# Patient Record
Sex: FEMALE | Race: WHITE | NOT HISPANIC OR LATINO | Employment: PART TIME | URBAN - NONMETROPOLITAN AREA
[De-identification: names, ages, dates, MRNs, and addresses within clinical notes are randomized per-mention and may not be internally consistent; named-entity substitution may affect disease eponyms.]

---

## 2017-12-05 ENCOUNTER — TRANSCRIBE ORDERS (OUTPATIENT)
Dept: ADMINISTRATIVE | Facility: HOSPITAL | Age: 32
End: 2017-12-05

## 2017-12-05 ENCOUNTER — HOSPITAL ENCOUNTER (OUTPATIENT)
Dept: GENERAL RADIOLOGY | Facility: HOSPITAL | Age: 32
Discharge: HOME | End: 2017-12-05
Admitting: NURSE PRACTITIONER

## 2017-12-05 DIAGNOSIS — M54.2 CERVICAL PAIN: Primary | ICD-10-CM

## 2017-12-05 DIAGNOSIS — M54.2 CERVICAL PAIN: ICD-10-CM

## 2017-12-05 PROCEDURE — 72050 X-RAY EXAM NECK SPINE 4/5VWS: CPT

## 2017-12-05 PROCEDURE — 72050 X-RAY EXAM NECK SPINE 4/5VWS: CPT | Performed by: RADIOLOGY

## 2019-02-01 ENCOUNTER — HOSPITAL ENCOUNTER (EMERGENCY)
Facility: HOSPITAL | Age: 34
Discharge: HOME | End: 2019-02-01
Admitting: EMERGENCY MEDICINE

## 2019-02-01 ENCOUNTER — APPOINTMENT (OUTPATIENT)
Dept: GENERAL RADIOLOGY | Facility: HOSPITAL | Age: 34
End: 2019-02-01

## 2019-02-01 VITALS
HEART RATE: 81 BPM | TEMPERATURE: 98.3 F | DIASTOLIC BLOOD PRESSURE: 72 MMHG | HEIGHT: 65 IN | SYSTOLIC BLOOD PRESSURE: 125 MMHG | BODY MASS INDEX: 24.16 KG/M2 | OXYGEN SATURATION: 99 % | WEIGHT: 145 LBS | RESPIRATION RATE: 18 BRPM

## 2019-02-01 DIAGNOSIS — R93.7 ABNORMAL X-RAY OF THORACIC SPINE: ICD-10-CM

## 2019-02-01 DIAGNOSIS — M54.6 ACUTE RIGHT-SIDED THORACIC BACK PAIN: Primary | ICD-10-CM

## 2019-02-01 LAB
B-HCG UR QL: NEGATIVE
BACTERIA UR QL AUTO: ABNORMAL /HPF
BILIRUB UR QL STRIP: NEGATIVE
CLARITY UR: ABNORMAL
COLOR UR: ABNORMAL
GLUCOSE UR STRIP-MCNC: NEGATIVE MG/DL
HGB UR QL STRIP.AUTO: ABNORMAL
HYALINE CASTS UR QL AUTO: ABNORMAL /LPF
KETONES UR QL STRIP: ABNORMAL
LEUKOCYTE ESTERASE UR QL STRIP.AUTO: ABNORMAL
NITRITE UR QL STRIP: NEGATIVE
PH UR STRIP.AUTO: 7 [PH] (ref 5–8)
PROT UR QL STRIP: ABNORMAL
RBC # UR: ABNORMAL /HPF
REF LAB TEST METHOD: ABNORMAL
SP GR UR STRIP: 1.03 (ref 1–1.03)
SQUAMOUS #/AREA URNS HPF: ABNORMAL /HPF
UROBILINOGEN UR QL STRIP: ABNORMAL
WBC UR QL AUTO: ABNORMAL /HPF

## 2019-02-01 PROCEDURE — 81001 URINALYSIS AUTO W/SCOPE: CPT | Performed by: NURSE PRACTITIONER

## 2019-02-01 PROCEDURE — 72072 X-RAY EXAM THORAC SPINE 3VWS: CPT | Performed by: RADIOLOGY

## 2019-02-01 PROCEDURE — 81025 URINE PREGNANCY TEST: CPT | Performed by: NURSE PRACTITIONER

## 2019-02-01 PROCEDURE — 96372 THER/PROPH/DIAG INJ SC/IM: CPT

## 2019-02-01 PROCEDURE — 71101 X-RAY EXAM UNILAT RIBS/CHEST: CPT | Performed by: RADIOLOGY

## 2019-02-01 PROCEDURE — 25010000002 ORPHENADRINE CITRATE PER 60 MG: Performed by: NURSE PRACTITIONER

## 2019-02-01 PROCEDURE — 71101 X-RAY EXAM UNILAT RIBS/CHEST: CPT

## 2019-02-01 PROCEDURE — 25010000002 KETOROLAC TROMETHAMINE PER 15 MG: Performed by: NURSE PRACTITIONER

## 2019-02-01 PROCEDURE — 72072 X-RAY EXAM THORAC SPINE 3VWS: CPT

## 2019-02-01 PROCEDURE — 99283 EMERGENCY DEPT VISIT LOW MDM: CPT

## 2019-02-01 RX ORDER — ORPHENADRINE CITRATE 30 MG/ML
60 INJECTION INTRAMUSCULAR; INTRAVENOUS ONCE
Status: COMPLETED | OUTPATIENT
Start: 2019-02-01 | End: 2019-02-01

## 2019-02-01 RX ORDER — HYDROCODONE BITARTRATE AND ACETAMINOPHEN 5; 325 MG/1; MG/1
1 TABLET ORAL EVERY 6 HOURS PRN
Qty: 12 TABLET | Refills: 0 | Status: SHIPPED | OUTPATIENT
Start: 2019-02-01 | End: 2022-08-12

## 2019-02-01 RX ORDER — CYCLOBENZAPRINE HCL 10 MG
10 TABLET ORAL 3 TIMES DAILY PRN
Qty: 21 TABLET | Refills: 0 | Status: SHIPPED | OUTPATIENT
Start: 2019-02-01 | End: 2022-08-12

## 2019-02-01 RX ORDER — KETOROLAC TROMETHAMINE 30 MG/ML
60 INJECTION, SOLUTION INTRAMUSCULAR; INTRAVENOUS ONCE
Status: COMPLETED | OUTPATIENT
Start: 2019-02-01 | End: 2019-02-01

## 2019-02-01 RX ADMIN — KETOROLAC TROMETHAMINE 60 MG: 60 INJECTION, SOLUTION INTRAMUSCULAR at 15:09

## 2019-02-01 RX ADMIN — ORPHENADRINE CITRATE 60 MG: 30 INJECTION INTRAMUSCULAR; INTRAVENOUS at 15:09

## 2019-02-01 NOTE — DISCHARGE INSTRUCTIONS
Follow up with your primary care provider to discuss further imaging and treatment.    Return to the emergency room for worsening symptoms.

## 2019-02-01 NOTE — ED PROVIDER NOTES
Subjective     History provided by:  Patient   used: No    Back Pain   Location:  Thoracic spine  Quality:  Aching and shooting  Radiates to:  Does not radiate  Pain severity:  Moderate  Pain is:  Same all the time  Onset quality:  Gradual  Duration:  2 days  Timing:  Constant  Progression:  Waxing and waning  Chronicity:  Recurrent  Context: lifting heavy objects and twisting    Context: not emotional stress, not falling, not jumping from heights, not MVA and not pedestrian accident    Relieved by:  None tried  Worsened by:  Twisting, standing and bending  Ineffective treatments:  Ibuprofen, lying down and NSAIDs  Associated symptoms: no abdominal swelling, no fever, no headaches, no leg pain, no numbness, no paresthesias, no pelvic pain, no perianal numbness and no weakness    Risk factors: no hx of cancer, no hx of osteoporosis, not obese, no recent surgery and no steroid use        Review of Systems   Constitutional: Negative.  Negative for fever.   HENT: Negative.    Eyes: Negative.    Respiratory: Negative.    Cardiovascular: Negative.    Gastrointestinal: Negative.    Endocrine: Negative.    Genitourinary: Negative.  Negative for pelvic pain.   Musculoskeletal: Positive for back pain.   Skin: Negative.    Allergic/Immunologic: Negative.    Neurological: Negative.  Negative for weakness, numbness, headaches and paresthesias.   Hematological: Negative.    Psychiatric/Behavioral: Negative.        Past Medical History:   Diagnosis Date   • Anxiety        No Known Allergies    Past Surgical History:   Procedure Laterality Date   • ADENOIDECTOMY     • TONSILLECTOMY         History reviewed. No pertinent family history.    Social History     Socioeconomic History   • Marital status:      Spouse name: Not on file   • Number of children: Not on file   • Years of education: Not on file   • Highest education level: Not on file   Tobacco Use   • Smoking status: Never Smoker   Substance and  Sexual Activity   • Alcohol use: No     Frequency: Never   • Drug use: No           Objective   Physical Exam   Constitutional: She is oriented to person, place, and time. She appears well-developed and well-nourished.   HENT:   Head: Normocephalic.   Right Ear: External ear normal.   Left Ear: External ear normal.   Mouth/Throat: Oropharynx is clear and moist.   Eyes: Conjunctivae and EOM are normal. Pupils are equal, round, and reactive to light.   Neck: Normal range of motion. Neck supple.   Cardiovascular: Normal rate, regular rhythm, normal heart sounds and intact distal pulses.   Pulmonary/Chest: Effort normal and breath sounds normal.   Abdominal: Soft. Bowel sounds are normal.   Musculoskeletal:        Thoracic back: She exhibits decreased range of motion, tenderness and pain.   Neurological: She is alert and oriented to person, place, and time.   Skin: Skin is warm and dry. Capillary refill takes less than 2 seconds.   Psychiatric: She has a normal mood and affect. Her behavior is normal. Thought content normal.   Nursing note and vitals reviewed.      Procedures           ED Course  ED Course as of Feb 10 1626   Fri Feb 01, 2019   1631 Discussed x-ray findings, including abnormal T8 vertebrae. Explained patient should follow up with PCP for further imaging and should return to the emergency room for worsening symptoms.  [MARILEE]      ED Course User Index  [MARILEE] Maxwell Chamapgne APRN                  [facility]      Final diagnoses:   Acute right-sided thoracic back pain   Abnormal x-ray of thoracic spine            Maxwell Champagne APRN  02/10/19 1626

## 2019-12-19 ENCOUNTER — OFFICE VISIT (OUTPATIENT)
Dept: RETAIL CLINIC | Facility: CLINIC | Age: 34
End: 2019-12-19

## 2019-12-19 VITALS
TEMPERATURE: 99.1 F | HEART RATE: 98 BPM | OXYGEN SATURATION: 98 % | SYSTOLIC BLOOD PRESSURE: 104 MMHG | DIASTOLIC BLOOD PRESSURE: 70 MMHG | BODY MASS INDEX: 23.16 KG/M2 | WEIGHT: 139.2 LBS | RESPIRATION RATE: 18 BRPM

## 2019-12-19 DIAGNOSIS — J10.1 INFLUENZA A: Primary | ICD-10-CM

## 2019-12-19 LAB
EXPIRATION DATE: ABNORMAL
EXPIRATION DATE: NORMAL
FLUAV AG NPH QL: POSITIVE
FLUBV AG NPH QL: NEGATIVE
INTERNAL CONTROL: ABNORMAL
INTERNAL CONTROL: NORMAL
Lab: ABNORMAL
Lab: NORMAL
S PYO AG THROAT QL: NEGATIVE

## 2019-12-19 PROCEDURE — 87880 STREP A ASSAY W/OPTIC: CPT | Performed by: NURSE PRACTITIONER

## 2019-12-19 PROCEDURE — 87804 INFLUENZA ASSAY W/OPTIC: CPT | Performed by: NURSE PRACTITIONER

## 2019-12-19 PROCEDURE — 99203 OFFICE O/P NEW LOW 30 MIN: CPT | Performed by: NURSE PRACTITIONER

## 2019-12-19 RX ORDER — IBUPROFEN 600 MG/1
600 TABLET ORAL EVERY 6 HOURS PRN
Qty: 15 TABLET | Refills: 0 | Status: SHIPPED | OUTPATIENT
Start: 2019-12-19 | End: 2019-12-22

## 2019-12-19 RX ORDER — OSELTAMIVIR PHOSPHATE 75 MG/1
75 CAPSULE ORAL 2 TIMES DAILY
Qty: 10 CAPSULE | Refills: 0 | Status: SHIPPED | OUTPATIENT
Start: 2019-12-19 | End: 2019-12-24

## 2019-12-19 NOTE — PATIENT INSTRUCTIONS
"Influenza, Adult  Influenza, more commonly known as \"the flu,\" is a viral infection that mainly affects the respiratory tract. The respiratory tract includes organs that help you breathe, such as the lungs, nose, and throat. The flu causes many symptoms similar to the common cold along with high fever and body aches.  The flu spreads easily from person to person (is contagious). Getting a flu shot (influenza vaccination) every year is the best way to prevent the flu.  What are the causes?  This condition is caused by the influenza virus. You can get the virus by:  · Breathing in droplets that are in the air from an infected person's cough or sneeze.  · Touching something that has been exposed to the virus (has been contaminated) and then touching your mouth, nose, or eyes.  What increases the risk?  The following factors may make you more likely to get the flu:  · Not washing or sanitizing your hands often.  · Having close contact with many people during cold and flu season.  · Touching your mouth, eyes, or nose without first washing or sanitizing your hands.  · Not getting a yearly (annual) flu shot.  You may have a higher risk for the flu, including serious problems such as a lung infection (pneumonia), if you:  · Are older than 65.  · Are pregnant.  · Have a weakened disease-fighting system (immune system). You may have a weakened immune system if you:  ? Have HIV or AIDS.  ? Are undergoing chemotherapy.  ? Are taking medicines that reduce (suppress) the activity of your immune system.  · Have a long-term (chronic) illness, such as heart disease, kidney disease, diabetes, or lung disease.  · Have a liver disorder.  · Are severely overweight (morbidly obese).  · Have anemia. This is a condition that affects your red blood cells.  · Have asthma.  What are the signs or symptoms?  Symptoms of this condition usually begin suddenly and last 4-14 days. They may include:  · Fever and chills.  · Headaches, body aches, or " muscle aches.  · Sore throat.  · Cough.  · Runny or stuffy (congested) nose.  · Chest discomfort.  · Poor appetite.  · Weakness or fatigue.  · Dizziness.  · Nausea or vomiting.  How is this diagnosed?  This condition may be diagnosed based on:  · Your symptoms and medical history.  · A physical exam.  · Swabbing your nose or throat and testing the fluid for the influenza virus.  How is this treated?  If the flu is diagnosed early, you can be treated with medicine that can help reduce how severe the illness is and how long it lasts (antiviral medicine). This may be given by mouth (orally) or through an IV.  Taking care of yourself at home can help relieve symptoms. Your health care provider may recommend:  · Taking over-the-counter medicines.  · Drinking plenty of fluids.  In many cases, the flu goes away on its own. If you have severe symptoms or complications, you may be treated in a hospital.  Follow these instructions at home:  Activity  · Rest as needed and get plenty of sleep.  · Stay home from work or school as told by your health care provider. Unless you are visiting your health care provider, avoid leaving home until your fever has been gone for 24 hours without taking medicine.  Eating and drinking  · Take an oral rehydration solution (ORS). This is a drink that is sold at pharmacies and retail stores.  · Drink enough fluid to keep your urine pale yellow.  · Drink clear fluids in small amounts as you are able. Clear fluids include water, ice chips, diluted fruit juice, and low-calorie sports drinks.  · Eat bland, easy-to-digest foods in small amounts as you are able. These foods include bananas, applesauce, rice, lean meats, toast, and crackers.  · Avoid drinking fluids that contain a lot of sugar or caffeine, such as energy drinks, regular sports drinks, and soda.  · Avoid alcohol.  · Avoid spicy or fatty foods.  General instructions         · Take over-the-counter and prescription medicines only as told  "by your health care provider.  · Use a cool mist humidifier to add humidity to the air in your home. This can make it easier to breathe.  · Cover your mouth and nose when you cough or sneeze.  · Wash your hands with soap and water often, especially after you cough or sneeze. If soap and water are not available, use alcohol-based hand .  · Keep all follow-up visits as told by your health care provider. This is important.  How is this prevented?    · Get an annual flu shot. You may get the flu shot in late summer, fall, or winter. Ask your health care provider when you should get your flu shot.  · Avoid contact with people who are sick during cold and flu season. This is generally fall and winter.  Contact a health care provider if:  · You develop new symptoms.  · You have:  ? Chest pain.  ? Diarrhea.  ? A fever.  · Your cough gets worse.  · You produce more mucus.  · You feel nauseous or you vomit.  Get help right away if:  · You develop shortness of breath or difficulty breathing.  · Your skin or nails turn a bluish color.  · You have severe pain or stiffness in your neck.  · You develop a sudden headache or sudden pain in your face or ear.  · You cannot eat or drink without vomiting.  Summary  · Influenza, more commonly known as \"the flu,\" is a viral infection that primarily affects your respiratory tract.  · Symptoms of the flu usually begin suddenly and last 4-14 days.  · Getting an annual flu shot is the best way to prevent getting the flu.  · Stay home from work or school as told by your health care provider. Unless you are visiting your health care provider, avoid leaving home until your fever has been gone for 24 hours without taking medicine.  · Keep all follow-up visits as told by your health care provider. This is important.  This information is not intended to replace advice given to you by your health care provider. Make sure you discuss any questions you have with your health care " provider.  Document Released: 12/15/2001 Document Revised: 06/05/2019 Document Reviewed: 06/05/2019  [employer] Interactive Patient Education © 2019 [employer]

## 2019-12-19 NOTE — PROGRESS NOTES
SALAZAROctaviano Maria is a 34 y.o. female.   Chief Complaint   Patient presents with   • Flu Symptoms      Influenza   This is a new problem. The current episode started yesterday. The problem has been waxing and waning. Associated symptoms include arthralgias, chills, congestion, coughing, a fever, headaches, myalgias, nausea and a sore throat. Pertinent negatives include no rash. The symptoms are aggravated by coughing. She has tried nothing for the symptoms.      Sirisha Maria  presents to [facility] with cc of sore throat and flu like symptoms since yesterday, both sons have tested positive for Flu earlier in the week. [facility] are [facility] to date. See ROS.  The following portions of the patient's history were reviewed and updated as appropriate: allergies, current medications, past family history, past medical history, past social history, past surgical history and problem list.    Current Outpatient Medications:   •  cyclobenzaprine (FLEXERIL) 10 MG tablet, Take 1 tablet by mouth 3 (Three) Times a Day As Needed for Muscle Spasms., Disp: 21 tablet, Rfl: 0  •  HYDROcodone-acetaminophen (NORCO) 5-325 MG per tablet, Take 1 tablet by mouth Every 6 (Six) Hours As Needed for Mild Pain ., Disp: 12 tablet, Rfl: 0  •  ibuprofen (ADVIL,MOTRIN) 600 MG tablet, Take 1 tablet by mouth Every 6 (Six) Hours As Needed for Mild Pain  for up to 3 days., Disp: 15 tablet, Rfl: 0  •  oseltamivir (TAMIFLU) 75 MG capsule, Take 1 capsule by mouth 2 (Two) Times a Day for 5 days., Disp: 10 capsule, Rfl: 0    No Known Allergies    Review of Systems   Constitutional: Positive for chills and fever.   HENT: Positive for congestion, postnasal drip, rhinorrhea (clear) and sore throat.    Respiratory: Positive for cough.    Gastrointestinal: Positive for nausea.   Musculoskeletal: Positive for arthralgias and myalgias.   Skin: Negative for rash.   Neurological: Positive for headaches.       Objective     Visit Vitals  BP  104/70   Pulse 98   Temp 99.1 °F (37.3 °C) (Temporal)   Resp 18   Wt 63.1 kg (139 lb 3.2 oz)   SpO2 98%   BMI 23.16 kg/m²         Physical Exam   Constitutional: She is oriented to person, place, and time. She appears well-developed and well-nourished. No distress.   HENT:   Head: Normocephalic and atraumatic.   Right Ear: Tympanic membrane and external ear normal.   Left Ear: Tympanic membrane and external ear normal.   Nose: Mucosal edema and rhinorrhea (clear) present. Right sinus exhibits no maxillary sinus tenderness and no frontal sinus tenderness. Left sinus exhibits no maxillary sinus tenderness and no frontal sinus tenderness.   Mouth/Throat: Mucous membranes are normal. Posterior oropharyngeal erythema present. Tonsillar exudate: tonsils absent.   Eyes: Pupils are equal, round, and reactive to light. Conjunctivae and EOM are normal.   Neck: Normal range of motion. Neck supple.   Cardiovascular: Normal rate, regular rhythm and normal heart sounds.   Pulmonary/Chest: Effort normal and breath sounds normal. No respiratory distress.   Abdominal: Soft. Bowel sounds are normal. She exhibits no distension. There is no tenderness.   Musculoskeletal: Normal range of motion.   Lymphadenopathy:     She has no cervical adenopathy.   Neurological: She is alert and oriented to person, place, and time.   Skin: Skin is warm and dry. No rash noted.   Psychiatric: She has a normal mood and affect. Her behavior is normal. Judgment and thought content normal.   Nursing note and vitals reviewed.      Lab Results (last 24 hours)     Procedure Component Value Units Date/Time    POCT rapid strep A [[id number]]  (Normal) Collected:  12/19/19 1131    Specimen:  Swab Updated:  12/19/19 1131     Rapid Strep A Screen Negative     Internal Control Passed     Lot Number [id number]     Expiration Date 2/28/21    POC Influenza A / B [[id number]]  (Abnormal) Collected:  12/19/19 1131    Specimen:  Swab Updated:  12/19/19 1132     Rapid  Influenza A Ag Positive     Rapid Influenza B Ag Negative     Internal Control Passed     Lot Number [id number]     Expiration Date 10/22/21          Assessment/Plan   Sirisha was seen today for flu symptoms.    Diagnoses and all orders for this visit:    Influenza A  -     POCT rapid strep A  -     POC Influenza A / B  -     oseltamivir (TAMIFLU) 75 MG capsule; Take 1 capsule by mouth 2 (Two) Times a Day for 5 days.  -     ibuprofen (ADVIL,MOTRIN) 600 MG tablet; Take 1 tablet by mouth Every 6 (Six) Hours As Needed for Mild Pain  for up to 3 days.

## 2022-08-12 ENCOUNTER — OFFICE VISIT (OUTPATIENT)
Dept: GASTROENTEROLOGY | Facility: CLINIC | Age: 37
End: 2022-08-12

## 2022-08-12 VITALS
WEIGHT: 179.6 LBS | OXYGEN SATURATION: 99 % | DIASTOLIC BLOOD PRESSURE: 73 MMHG | BODY MASS INDEX: 30.66 KG/M2 | HEIGHT: 64 IN | HEART RATE: 55 BPM | SYSTOLIC BLOOD PRESSURE: 109 MMHG

## 2022-08-12 DIAGNOSIS — R19.7 DIARRHEA, UNSPECIFIED TYPE: Primary | ICD-10-CM

## 2022-08-12 DIAGNOSIS — K62.5 BRBPR (BRIGHT RED BLOOD PER RECTUM): ICD-10-CM

## 2022-08-12 PROCEDURE — 99204 OFFICE O/P NEW MOD 45 MIN: CPT | Performed by: PHYSICIAN ASSISTANT

## 2022-08-12 NOTE — PROGRESS NOTES
Chief Complaint   Patient presents with   • Diarrhea   • Rectal Bleeding       Sirisha Maria is a 36 y.o. female who presents to the office today for evaluation of Diarrhea and Rectal Bleeding  .    HPI  Patient presents to the clinic today for evaluation of rectal bleeding and diarrhea.  Patient states that she has had issues with looser bowel movements for some time now-as far back as she can remember.  She states that the diarrhea tends to be worse when she is anxious or nervous.  She can have 5-6 bowel movements daily that are type V on the [place] stool scale.  She states that stools are very rarely ever watery.  She does not experience any abdominal pain prior to bowel movements however they do occur with urgency.  Bowel movements are large volume.  She does have her gallbladder intact.  She states that what became most concerning was roughly 2 weeks ago she had a couple days of bright red blood per rectum.  She states she went to have a bowel movement and there was quite a bit of blood mixed with stool.  At the time she was not experiencing any rectal pain.  She was unsure about hemorrhoids however did experience them with her pregnancy.  She denies straining with bowel movements.  Review of Systems   Constitutional: Positive for unexpected weight change. Negative for fever.   HENT: Negative for sore throat and trouble swallowing.    Eyes: Negative.    Respiratory: Negative for chest tightness.    Cardiovascular: Negative for chest pain.   Gastrointestinal: Positive for abdominal distention, anal bleeding, blood in stool, diarrhea and nausea. Negative for abdominal pain, constipation, rectal pain and vomiting.   Endocrine: Negative.    Genitourinary: Negative for difficulty urinating.   Musculoskeletal: Positive for back pain.   Skin: Negative.    Allergic/Immunologic: Negative for environmental allergies and food allergies.   Neurological: Negative for dizziness and headaches.   Hematological: Bruises/bleeds  "easily.   Psychiatric/Behavioral: Negative for sleep disturbance.       ACTIVE PROBLEMS:   Specialty Problems    None         PAST MEDICAL HISTORY:  Past Medical History:   Diagnosis Date   • Acid reflux    • Anxiety        SURGICAL HISTORY:  Past Surgical History:   Procedure Laterality Date   • ADENOIDECTOMY     • TONSILLECTOMY         FAMILY HISTORY:  History reviewed. No pertinent family history.    SOCIAL HISTORY:  Social History     Tobacco Use   • Smoking status: Never Smoker   • Smokeless tobacco: Never Used   Substance Use Topics   • Alcohol use: No       CURRENT MEDICATION:    Current Outpatient Medications:   •  riFAXIMin (XIFAXAN) 550 MG tablet, Take 1 tablet by mouth 3 (Three) Times a Day., Disp: 42 tablet, Rfl: 0    ALLERGIES:  Patient has no known allergies.    VISIT VITALS:  /73   Pulse 55   Ht 162.6 cm (64\")   Wt 81.5 kg (179 lb 9.6 oz)   SpO2 99%   BMI 30.83 kg/m²   Physical Exam  Constitutional:       General: She is not in acute distress.     Appearance: Normal appearance. She is well-developed.   HENT:      Head: Normocephalic and atraumatic.   Eyes:      Pupils: Pupils are equal, round, and reactive to light.   Cardiovascular:      Rate and Rhythm: Normal rate and regular rhythm.      Heart sounds: Normal heart sounds.   Pulmonary:      Effort: Pulmonary effort is normal. No respiratory distress.      Breath sounds: Normal breath sounds. No wheezing, rhonchi or rales.   Abdominal:      General: Abdomen is flat. Bowel sounds are normal. There is no distension.      Palpations: Abdomen is soft. There is no mass.      Tenderness: There is no abdominal tenderness. There is no guarding or rebound.      Hernia: No hernia is present.   Musculoskeletal:         General: No swelling. Normal range of motion.      Cervical back: Normal range of motion and neck supple.      Right lower leg: No edema.      Left lower leg: No edema.   Skin:     General: Skin is warm and dry.   Neurological:      " Mental Status: She is alert and oriented to person, place, and time.   Psychiatric:         Attention and Perception: Attention normal.         Mood and Affect: Mood normal.         Speech: Speech normal.         Behavior: Behavior normal. Behavior is cooperative.         Thought Content: Thought content normal.         Assessment    Due to patient's symptoms-she will go ahead and try a trial of Xifaxan 550 mg 3 times daily for 14 days.  If no symptom improvement we will look at a trial of amitriptyline 25 to 50 mg for serotonin induced diarrhea.  She has had an improvement in rectal bleeding and it was only 1 episode therefore we will not further investigate at this time.   Diagnosis Plan   1. Diarrhea, unspecified type  riFAXIMin (XIFAXAN) 550 MG tablet   2. BRBPR (bright red blood per rectum)         Return in about 6 weeks (around 9/23/2022).               This document has been electronically signed by Chioma Brown PA-C  August 12, 2022 13:20 EDT    Part of this note may be an electronic transcription/translation of spoken language to printed text using the Dragon Dictation System.

## 2023-02-01 ENCOUNTER — HOSPITAL ENCOUNTER (OUTPATIENT)
Dept: MAMMOGRAPHY | Facility: HOSPITAL | Age: 38
Discharge: HOME | End: 2023-02-01
Payer: COMMERCIAL

## 2023-02-01 ENCOUNTER — HOSPITAL ENCOUNTER (OUTPATIENT)
Dept: ULTRASOUND IMAGING | Facility: HOSPITAL | Age: 38
Discharge: HOME | End: 2023-02-01
Payer: COMMERCIAL

## 2023-02-01 DIAGNOSIS — N63.10 MASS OF RIGHT BREAST, UNSPECIFIED QUADRANT: ICD-10-CM

## 2023-02-01 PROCEDURE — 76642 ULTRASOUND BREAST LIMITED: CPT | Performed by: RADIOLOGY

## 2023-02-01 PROCEDURE — 77066 DX MAMMO INCL CAD BI: CPT

## 2023-02-01 PROCEDURE — 77062 BREAST TOMOSYNTHESIS BI: CPT | Performed by: RADIOLOGY

## 2023-02-01 PROCEDURE — G0279 TOMOSYNTHESIS, MAMMO: HCPCS

## 2023-02-01 PROCEDURE — 77066 DX MAMMO INCL CAD BI: CPT | Performed by: RADIOLOGY

## 2023-02-01 PROCEDURE — 76642 ULTRASOUND BREAST LIMITED: CPT

## 2023-06-07 LAB — EXTERNAL GROUP B STREP ANTIGEN: NEGATIVE

## 2023-08-28 ENCOUNTER — OFFICE VISIT (OUTPATIENT)
Dept: GASTROENTEROLOGY | Facility: CLINIC | Age: 38
End: 2023-08-28
Payer: COMMERCIAL

## 2023-08-28 VITALS
WEIGHT: 185 LBS | HEART RATE: 72 BPM | BODY MASS INDEX: 31.58 KG/M2 | HEIGHT: 64 IN | DIASTOLIC BLOOD PRESSURE: 61 MMHG | SYSTOLIC BLOOD PRESSURE: 117 MMHG

## 2023-08-28 DIAGNOSIS — R19.7 DIARRHEA, UNSPECIFIED TYPE: Primary | ICD-10-CM

## 2023-08-28 DIAGNOSIS — R14.0 BLOATING: ICD-10-CM

## 2023-08-28 DIAGNOSIS — R10.84 GENERALIZED ABDOMINAL PAIN: ICD-10-CM

## 2023-08-28 PROCEDURE — 86003 ALLG SPEC IGE CRUDE XTRC EA: CPT | Performed by: PHYSICIAN ASSISTANT

## 2023-08-28 PROCEDURE — 86258 DGP ANTIBODY EACH IG CLASS: CPT | Performed by: PHYSICIAN ASSISTANT

## 2023-08-28 PROCEDURE — 86364 TISS TRNSGLTMNASE EA IG CLAS: CPT | Performed by: PHYSICIAN ASSISTANT

## 2023-08-28 PROCEDURE — 81382 HLA II TYPING 1 LOC HR: CPT | Performed by: PHYSICIAN ASSISTANT

## 2023-08-28 PROCEDURE — 82784 ASSAY IGA/IGD/IGG/IGM EACH: CPT | Performed by: PHYSICIAN ASSISTANT

## 2023-08-28 PROCEDURE — 86231 EMA EACH IG CLASS: CPT | Performed by: PHYSICIAN ASSISTANT

## 2023-08-28 NOTE — PROGRESS NOTES
DATE:  8/28/2023    DIAGNOSIS: Diarrhea    CHIEF COMPLAINT:  Chief Complaint   Patient presents with    Diarrhea     HPI  Patient presents to the clinic today for evaluation of rectal bleeding and diarrhea.  Patient states that she has had issues with looser bowel movements for some time now-as far back as she can remember.  She states that the diarrhea tends to be worse when she is anxious or nervous.  She can have 5-6 bowel movements daily that are type V on the [place] stool scale.  She states that stools are very rarely ever watery.  She does not experience any abdominal pain prior to bowel movements however they do occur with urgency.  Bowel movements are large volume.  She does have her gallbladder intact.  She states that what became most concerning was roughly 2 weeks ago she had a couple days of bright red blood per rectum.  She states she went to have a bowel movement and there was quite a bit of blood mixed with stool.  At the time she was not experiencing any rectal pain.  She was unsure about hemorrhoids however did experience them with her pregnancy.  She denies straining with bowel movements.      Interval History:  Patient notes that she has not had much improvement in abdominal pain, bloating and loose bowel movements since last seen in clinic.  Stools are still ranging from type 5-6 on the [place] stool scale.  Patient notes abdominal pain occurring prior prior to bowel movements as well as significant bloating that can occur after eating.  She is wanting to have workup done for celiac disease.Patient states that her children have been following with GI due to very similar symptoms like she is currently experiencing.  Patient states that her youngest son, age 8, was diagnosed with celiac disease based on blood work however biopsies did not confirm diagnosis.  She has had him on a celiac diet for several months now without significant improvement in symptoms.  He also underwent sucrose testing that  was also positive.  Patient notes when monitoring sugar intake his symptoms do seem to improve.  She also has another son that results revealed difficulty digesting sucrose.  She has not underwent any type of celiac testing nor is she currently doing a gluten-free diet.  She is    The following portions of the patient's history were reviewed and updated as appropriate: allergies, current medications, past family history, past medical history, past social history, past surgical history and problem list.  REVIEW OF SYSTEMS:   Review of Systems   Constitutional:  Positive for unexpected weight change. Negative for fever.   HENT:  Negative for sore throat and trouble swallowing.    Eyes: Negative.    Respiratory:  Negative for chest tightness.    Cardiovascular:  Negative for chest pain.   Gastrointestinal:  Positive for abdominal distention, anal bleeding, blood in stool, diarrhea and nausea. Negative for abdominal pain, constipation, rectal pain and vomiting.   Endocrine: Negative.    Genitourinary:  Negative for difficulty urinating.   Musculoskeletal:  Positive for back pain.   Skin: Negative.    Allergic/Immunologic: Negative for environmental allergies and food allergies.   Neurological:  Negative for dizziness and headaches.   Hematological:  Bruises/bleeds easily.   Psychiatric/Behavioral:  Negative for sleep disturbance.      PAST MEDICAL HISTORY:  Past Medical History:   Diagnosis Date    Acid reflux     Anxiety        PAST SURGICAL HISTORY:  Past Surgical History:   Procedure Laterality Date    ADENOIDECTOMY      TONSILLECTOMY         SOCIAL HISTORY:  Social History     Socioeconomic History    Marital status:    Tobacco Use    Smoking status: Never    Smokeless tobacco: Never   Substance and Sexual Activity    Alcohol use: No    Drug use: No    Sexual activity: Yes     Birth control/protection: I.U.D.     Comment: 2 sons works at        FAMILY HISTORY:  Family History   Problem Relation Age of Onset  "   Breast cancer Neg Hx        MEDICATIONS:    No current outpatient medications on file.    ALLERGIES:  No Known Allergies    VIST VITALS/PHYSICAL EXAM:  /61   Pulse 72   Ht 162.6 cm (64\")   Wt 83.9 kg (185 lb)   BMI 31.76 kg/mý   Physical Exam  Constitutional:       General: She is not in acute distress.     Appearance: Normal appearance. She is well-developed.   HENT:      Head: Normocephalic and atraumatic.   Eyes:      Pupils: Pupils are equal, round, and reactive to light.   Pulmonary:      Effort: Pulmonary effort is normal. No respiratory distress.   Abdominal:      General: Abdomen is flat. There is no distension.      Palpations: Abdomen is soft. There is no mass.      Tenderness: There is no abdominal tenderness. There is no guarding or rebound.      Hernia: No hernia is present.   Musculoskeletal:         General: No swelling. Normal range of motion.      Cervical back: Normal range of motion.   Skin:     General: Skin is warm and dry.   Neurological:      Mental Status: She is alert and oriented to person, place, and time.   Psychiatric:         Attention and Perception: Attention normal.         Mood and Affect: Mood normal.         Speech: Speech normal.         Behavior: Behavior normal. Behavior is cooperative.         Thought Content: Thought content normal.         PATHOLOGY:  NONE      ENDOSCOPY:  NONE      IMAGING:  No Images in the past 120 days found..        RECENT LABS:  Lab Results   Component Value Date    WBC 13.0 (H) 06/20/2015    HGB 11.2 (L) 06/20/2015    HCT 34.3 (L) 06/20/2015    MCV 90.5 06/20/2015    RDW 12.7 06/20/2015     06/20/2015    NEUTRORELPCT 78.3 (H) 06/20/2015    LYMPHORELPCT 12.3 (L) 06/20/2015    MONORELPCT 6.7 06/20/2015    EOSRELPCT 2.2 06/20/2015    BASORELPCT 0.2 06/20/2015    NEUTROABS 10.2 (H) 06/20/2015    LYMPHSABS 1.6 06/20/2015       No results found for: NA, K, CO2, CL, BUN, CREATININE, EGFRIFNONA, EGFRIFAFRI, GLUCOSE, CALCIUM, ALKPHOS, AST, " ALT, BILITOT, ALBUMIN, PROTEINTOT, MG, PHOS          ASSESSMENT & PLAN:  After discussing patient's symptoms-we will order celiac panel, celiac HLA DQ association and food allergy profile labs.  She was also given sucrose breath test take-home test to complete and send in at her convenience.  Patient will be called with results when available.   Diagnosis Plan   1. Diarrhea, unspecified type  Celiac Disease HLA DQ Assoc.    Celiac Comprehensive Panel    Food Allergy Profile      2. Generalized abdominal pain  Celiac Disease HLA DQ Assoc.    Celiac Comprehensive Panel    Food Allergy Profile      3. Bloating  Celiac Disease HLA DQ Assoc.    Celiac Comprehensive Panel    Food Allergy Profile          Return if symptoms worsen or fail to improve.        Electronically Signed by: Chioma Brown PA-C , August 28, 2023 12:43 EDT       CC:   Keyshawn Mccrary APRN

## 2023-08-29 LAB
ENDOMYSIUM IGA SER QL: NEGATIVE
GLIADIN PEPTIDE IGA SER-ACNC: 8 UNITS (ref 0–19)
GLIADIN PEPTIDE IGG SER-ACNC: 3 UNITS (ref 0–19)
IGA SERPL-MCNC: 89 MG/DL (ref 87–352)
TTG IGA SER-ACNC: <2 U/ML (ref 0–3)
TTG IGG SER-ACNC: 10 U/ML (ref 0–5)

## 2023-08-31 ENCOUNTER — TELEPHONE (OUTPATIENT)
Dept: GASTROENTEROLOGY | Facility: CLINIC | Age: 38
End: 2023-08-31
Payer: COMMERCIAL

## 2023-08-31 LAB
CLAM IGE QN: <0.1 KU/L
CODFISH IGE QN: <0.1 KU/L
CONV CLASS DESCRIPTION: ABNORMAL
CORN IGE QN: <0.1 KU/L
COW MILK IGE QN: 0.21 KU/L
EGG WHITE IGE QN: <0.1 KU/L
PEANUT IGE QN: <0.1 KU/L
SCALLOP IGE QN: <0.1 KU/L
SESAME SEED IGE QN: <0.1 KU/L
SHRIMP IGE QN: <0.1 KU/L
SOYBEAN IGE QN: <0.1 KU/L
WALNUT IGE QN: <0.1 KU/L
WHEAT IGE QN: <0.1 KU/L

## 2023-08-31 NOTE — TELEPHONE ENCOUNTER
Please let patient know her celiac panel is negative and food allergy profile reveal a possible low allergy to lactose.

## 2023-09-06 ENCOUNTER — TELEPHONE (OUTPATIENT)
Dept: GASTROENTEROLOGY | Facility: CLINIC | Age: 38
End: 2023-09-06
Payer: COMMERCIAL

## 2023-09-06 NOTE — TELEPHONE ENCOUNTER
Please let patient know that like her children, she also has low sucrase activity which can lead to abdominal pain, bloating, and diarrhea. More than likely this is her issue.

## 2023-09-11 ENCOUNTER — TELEPHONE (OUTPATIENT)
Dept: GASTROENTEROLOGY | Facility: CLINIC | Age: 38
End: 2023-09-11
Payer: COMMERCIAL

## 2023-09-11 LAB
ANNOTATION COMMENT IMP: NORMAL
HLA-DQ8 QL: POSITIVE
REF LAB TEST METHOD: NORMAL

## 2023-09-11 NOTE — TELEPHONE ENCOUNTER
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

## 2023-11-21 LAB
EXTERNAL ABO GROUPING: NORMAL
EXTERNAL ANTIBODY SCREEN: NEGATIVE
EXTERNAL HEPATITIS B SURFACE ANTIGEN: NEGATIVE
EXTERNAL RH FACTOR: POSITIVE
EXTERNAL RUBELLA QUALITATIVE: NORMAL
EXTERNAL SYPHILIS RPR SCREEN: NORMAL
HIV1 P24 AG SERPL QL IA: NORMAL

## 2024-02-29 ENCOUNTER — OFFICE VISIT (OUTPATIENT)
Dept: FAMILY MEDICINE CLINIC | Facility: CLINIC | Age: 39
End: 2024-02-29
Payer: COMMERCIAL

## 2024-02-29 VITALS
TEMPERATURE: 97.5 F | DIASTOLIC BLOOD PRESSURE: 68 MMHG | WEIGHT: 191.8 LBS | HEART RATE: 88 BPM | SYSTOLIC BLOOD PRESSURE: 120 MMHG | OXYGEN SATURATION: 99 % | BODY MASS INDEX: 32.92 KG/M2

## 2024-02-29 DIAGNOSIS — Z3A.21 21 WEEKS GESTATION OF PREGNANCY: Chronic | ICD-10-CM

## 2024-02-29 DIAGNOSIS — K21.9 GASTROESOPHAGEAL REFLUX DISEASE WITHOUT ESOPHAGITIS: Primary | ICD-10-CM

## 2024-02-29 PROBLEM — Z34.90 PREGNANCY: Chronic | Status: ACTIVE | Noted: 2024-02-29

## 2024-02-29 PROBLEM — Z34.90 PREGNANCY: Status: ACTIVE | Noted: 2024-02-29

## 2024-02-29 PROCEDURE — 1159F MED LIST DOCD IN RCRD: CPT | Performed by: INTERNAL MEDICINE

## 2024-02-29 PROCEDURE — 99203 OFFICE O/P NEW LOW 30 MIN: CPT | Performed by: INTERNAL MEDICINE

## 2024-02-29 PROCEDURE — 1160F RVW MEDS BY RX/DR IN RCRD: CPT | Performed by: INTERNAL MEDICINE

## 2024-02-29 RX ORDER — PANTOPRAZOLE SODIUM 20 MG/1
20 TABLET, DELAYED RELEASE ORAL DAILY
COMMUNITY
Start: 2024-02-07

## 2024-02-29 RX ORDER — VITAMIN A, ASCORBIC ACID, CHOLECALCIFEROL, TOCOPHEROL, THIAMINE MONONITRATE, RIBOFLAVIN, PYRIDOXINE, FOLIC ACID, CYANOCOBALAMIN, CALCIUM CARBONATE, FERROUS FUMARATE, ZINC OXIDE, CUPRIC OXIDE, NIACINAMIDE, AND FISH OIL 27-1-250MG
KIT ORAL
COMMUNITY
Start: 2024-02-07

## 2024-02-29 NOTE — PROGRESS NOTES
Patient Name: Sirisha Bowie Today's Date: 2024   Patient MRN / CSN: [id number] / [id number] Date of Encounter: 2024   Patient Age / : 38 y.o. / 1985 Encounter Provider: Regi Holland DO   Referring Physician: No ref. provider found          Sirisha is a 38 y.o. female who is being seen today for [facility] and GI Problem      History of Present Illness    Sirisha is a very pleasant patient who presents today to [facility], currently 21 weeks pregnant.  She presents with complaints of frequent heartburn.  She reports heartburn every day, multiple times a day.  She is been taking Tums as needed.  Her OB/GYN, Dr. Card, prescribed Protonix for her to take daily, but she has been afraid to do so.    Allergies include:Patient has no known allergies.  Current Outpatient Medications   Medication Sig Dispense Refill    pantoprazole (PROTONIX) 20 MG EC tablet Take 1 tablet by mouth Daily.      Prenatal Vit-Fe Fum-FA-Omega (PNV Prenatal Plus Multivit+DHA) 27-1 & 312 MG misc        No current facility-administered medications for this visit.     Past Medical History:   Diagnosis Date    Acid reflux     Anxiety      Family History   Problem Relation Age of Onset    Anxiety disorder Mother         Generalized anxiety    Arthritis Mother     Asthma Mother     COPD Mother     Kidney disease Mother     Anxiety disorder Father         Generalized anxiety    Other Father         Adisions disease    Alcohol abuse Maternal Grandfather     Diabetes Maternal Grandfather     Heart disease Maternal Grandfather     Stroke Maternal Grandfather     Vision loss Maternal Grandfather     Hearing loss Maternal Grandmother     Anxiety disorder Son         Generalized anxiety    Arthritis Maternal Aunt     Cancer Maternal Aunt     Drug abuse Sister     Hypertension Maternal Uncle     Liver disease Maternal Uncle     Miscarriages / Stillbirths Maternal Aunt     Other Son         Celiac disease    Breast cancer  Neg Hx      Past Surgical History:   Procedure Laterality Date    ADENOIDECTOMY      TONSILLECTOMY       Social History     Substance and Sexual Activity   Alcohol Use No     Social History     Tobacco Use   Smoking Status Never   Smokeless Tobacco Never     Social History     Substance and Sexual Activity   Drug Use No     Review of Systems   Constitutional:  Positive for fatigue.   Respiratory:  Positive for shortness of breath. Negative for wheezing.         Shortness of air at times, described as mild   Cardiovascular:  Negative for chest pain.   Gastrointestinal:  Negative for blood in stool.        Heartburn daily   Genitourinary:  Negative for vaginal bleeding.        21 weeks pregnant        Depression Assessment Review:  PHQ-9 Total Score: 0  Vital Signs & Measurements Taken This Encounter  /68 (BP Location: Left arm, Patient Position: Sitting, Cuff Size: Adult)   Pulse 88   Temp 97.5 °F (36.4 °C) (Temporal)   Wt 87 kg (191 lb 12.8 oz)   SpO2 99%   BMI 32.92 kg/m²    SpO2 Percentage    02/29/24 1058   SpO2: 99%        BMI is >= 30 and <35. (Class 1 Obesity). The following options were offered after discussion;: nutrition counseling/recommendations      Physical Exam  Vitals reviewed.   Constitutional:       General: She is not in acute distress.  HENT:      Head: Normocephalic and atraumatic.   Eyes:      General: No scleral icterus.     Extraocular Movements: Extraocular movements intact.      Conjunctiva/sclera: Conjunctivae normal.      Pupils: Pupils are equal, round, and reactive to light.   Cardiovascular:      Rate and Rhythm: Normal rate and regular rhythm.   Pulmonary:      Effort: Pulmonary effort is normal. No respiratory distress.      Breath sounds: Normal breath sounds.   Abdominal:      Palpations: Abdomen is soft.      Tenderness: There is no abdominal tenderness.      Comments: Gravid abdomen   Musculoskeletal:         General: No swelling.   Skin:     General: Skin is warm and  dry.      Coloration: Skin is not jaundiced.   Neurological:      Mental Status: She is alert.   Psychiatric:         Mood and Affect: Mood normal.         Behavior: Behavior normal.              Assessment & Plan  Patient Active Problem List   Diagnosis    Pregnancy    Gastroesophageal reflux disease without esophagitis       ICD-10-CM ICD-9-CM   1. Gastroesophageal reflux disease without esophagitis  K21.9 530.81   2. 21 weeks gestation of pregnancy  Z3A.21 V22.2     No orders of the defined types were placed in this encounter.      Meds Ordered During Visit:  No orders of the defined types were placed in this encounter.    I encouraged patient to take Protonix as prescribed by her OB/GYN.  I encouraged her to follow-up with OB as planned.  We will plan on a 6-month follow-up.  We may consider updating pulmonary function test if shortness of air is persistent or worsens.    Return in about 6 months (around 8/29/2024), or if symptoms worsen or fail to improve, for Annual.          Referring Provider (if known): No ref. provider found      This document has been electronically signed by Regi Holland DO  February 29, 2024 12:38 EST    Regi Holland DO, FACOI  [address] [place] [address]  [phone] (office)    Part of this note may be an electronic transcription/translation of spoken language to printed text using the Dragon Dictation System.

## 2024-03-01 ENCOUNTER — PATIENT ROUNDING (BHMG ONLY) (OUTPATIENT)
Dept: FAMILY MEDICINE CLINIC | Facility: CLINIC | Age: 39
End: 2024-03-01
Payer: COMMERCIAL

## 2024-06-07 LAB — EXTERNAL GROUP B STREP ANTIGEN: NEGATIVE

## 2024-06-21 ENCOUNTER — ANESTHESIA (OUTPATIENT)
Dept: LABOR AND DELIVERY | Facility: HOSPITAL | Age: 39
End: 2024-06-21
Payer: COMMERCIAL

## 2024-06-21 ENCOUNTER — HOSPITAL ENCOUNTER (INPATIENT)
Facility: HOSPITAL | Age: 39
LOS: 3 days | Discharge: HOME | End: 2024-06-24
Attending: OBSTETRICS & GYNECOLOGY | Admitting: OBSTETRICS & GYNECOLOGY
Payer: COMMERCIAL

## 2024-06-21 ENCOUNTER — ANESTHESIA EVENT (OUTPATIENT)
Dept: LABOR AND DELIVERY | Facility: HOSPITAL | Age: 39
End: 2024-06-21
Payer: COMMERCIAL

## 2024-06-21 PROBLEM — O42.90 AMNIOTIC FLUID LEAKING: Status: ACTIVE | Noted: 2024-06-21

## 2024-06-21 PROBLEM — Z37.9 NORMAL LABOR: Status: ACTIVE | Noted: 2024-06-21

## 2024-06-21 LAB
ABO GROUP BLD: NORMAL
ABO GROUP BLD: NORMAL
AMPHET+METHAMPHET UR QL: NEGATIVE
AMPHETAMINES UR QL: NEGATIVE
BARBITURATES UR QL SCN: NEGATIVE
BASOPHILS # BLD AUTO: 0.02 10*3/MM3 (ref 0–0.2)
BASOPHILS NFR BLD AUTO: 0.2 % (ref 0–1.5)
BENZODIAZ UR QL SCN: NEGATIVE
BLD GP AB SCN SERPL QL: NEGATIVE
BUPRENORPHINE SERPL-MCNC: NEGATIVE NG/ML
CANNABINOIDS SERPL QL: NEGATIVE
COCAINE UR QL: NEGATIVE
DEPRECATED RDW RBC AUTO: 40.3 FL (ref 37–54)
EOSINOPHIL # BLD AUTO: 0.06 10*3/MM3 (ref 0–0.4)
EOSINOPHIL NFR BLD AUTO: 0.5 % (ref 0.3–6.2)
ERYTHROCYTE [DISTWIDTH] IN BLOOD BY AUTOMATED COUNT: 12.7 % (ref 12.3–15.4)
FENTANYL UR-MCNC: NEGATIVE NG/ML
HCT VFR BLD AUTO: 35.7 % (ref 34–46.6)
HGB BLD-MCNC: 11.7 G/DL (ref 12–15.9)
IMM GRANULOCYTES # BLD AUTO: 0.1 10*3/MM3 (ref 0–0.05)
IMM GRANULOCYTES NFR BLD AUTO: 0.9 % (ref 0–0.5)
LYMPHOCYTES # BLD AUTO: 1.97 10*3/MM3 (ref 0.7–3.1)
LYMPHOCYTES NFR BLD AUTO: 17.8 % (ref 19.6–45.3)
MCH RBC QN AUTO: 28.6 PG (ref 26.6–33)
MCHC RBC AUTO-ENTMCNC: 32.8 G/DL (ref 31.5–35.7)
MCV RBC AUTO: 87.3 FL (ref 79–97)
METHADONE UR QL SCN: NEGATIVE
MONOCYTES # BLD AUTO: 0.67 10*3/MM3 (ref 0.1–0.9)
MONOCYTES NFR BLD AUTO: 6.1 % (ref 5–12)
NEUTROPHILS NFR BLD AUTO: 74.5 % (ref 42.7–76)
NEUTROPHILS NFR BLD AUTO: 8.22 10*3/MM3 (ref 1.7–7)
NRBC BLD AUTO-RTO: 0 /100 WBC (ref 0–0.2)
OPIATES UR QL: NEGATIVE
OXYCODONE UR QL SCN: NEGATIVE
PCP UR QL SCN: NEGATIVE
PLATELET # BLD AUTO: 237 10*3/MM3 (ref 140–450)
PMV BLD AUTO: 10.6 FL (ref 6–12)
RBC # BLD AUTO: 4.09 10*6/MM3 (ref 3.77–5.28)
RH BLD: POSITIVE
RH BLD: POSITIVE
T&S EXPIRATION DATE: NORMAL
TRICYCLICS UR QL SCN: NEGATIVE
WBC NRBC COR # BLD AUTO: 11.04 10*3/MM3 (ref 3.4–10.8)

## 2024-06-21 PROCEDURE — 86901 BLOOD TYPING SEROLOGIC RH(D): CPT

## 2024-06-21 PROCEDURE — 86900 BLOOD TYPING SEROLOGIC ABO: CPT | Performed by: OBSTETRICS & GYNECOLOGY

## 2024-06-21 PROCEDURE — 85025 COMPLETE CBC W/AUTO DIFF WBC: CPT | Performed by: OBSTETRICS & GYNECOLOGY

## 2024-06-21 PROCEDURE — 25810000003 LACTATED RINGERS PER 1000 ML: Performed by: OBSTETRICS & GYNECOLOGY

## 2024-06-21 PROCEDURE — C1755 CATHETER, INTRASPINAL: HCPCS

## 2024-06-21 PROCEDURE — 86900 BLOOD TYPING SEROLOGIC ABO: CPT

## 2024-06-21 PROCEDURE — 25010000002 LIDOCAINE 1 % SOLUTION: Performed by: NURSE ANESTHETIST, CERTIFIED REGISTERED

## 2024-06-21 PROCEDURE — 59025 FETAL NON-STRESS TEST: CPT

## 2024-06-21 PROCEDURE — 25810000003 LACTATED RINGERS SOLUTION: Performed by: OBSTETRICS & GYNECOLOGY

## 2024-06-21 PROCEDURE — 80307 DRUG TEST PRSMV CHEM ANLYZR: CPT | Performed by: OBSTETRICS & GYNECOLOGY

## 2024-06-21 PROCEDURE — 25010000002 FENTANYL CITRATE (PF) 50 MCG/ML SOLUTION: Performed by: NURSE ANESTHETIST, CERTIFIED REGISTERED

## 2024-06-21 PROCEDURE — 86850 RBC ANTIBODY SCREEN: CPT | Performed by: OBSTETRICS & GYNECOLOGY

## 2024-06-21 PROCEDURE — C1755 CATHETER, INTRASPINAL: HCPCS | Performed by: NURSE ANESTHETIST, CERTIFIED REGISTERED

## 2024-06-21 PROCEDURE — 86780 TREPONEMA PALLIDUM: CPT | Performed by: OBSTETRICS & GYNECOLOGY

## 2024-06-21 PROCEDURE — 86901 BLOOD TYPING SEROLOGIC RH(D): CPT | Performed by: OBSTETRICS & GYNECOLOGY

## 2024-06-21 PROCEDURE — G0463 HOSPITAL OUTPT CLINIC VISIT: HCPCS

## 2024-06-21 PROCEDURE — 25010000002 ONDANSETRON PER 1 MG: Performed by: OBSTETRICS & GYNECOLOGY

## 2024-06-21 PROCEDURE — 25810000003 LACTATED RINGERS SOLUTION: Performed by: NURSE ANESTHETIST, CERTIFIED REGISTERED

## 2024-06-21 PROCEDURE — 25010000002 DIPHENHYDRAMINE PER 50 MG: Performed by: OBSTETRICS & GYNECOLOGY

## 2024-06-21 RX ORDER — FAMOTIDINE 10 MG/ML
20 INJECTION, SOLUTION INTRAVENOUS EVERY 12 HOURS SCHEDULED
Status: DISCONTINUED | OUTPATIENT
Start: 2024-06-21 | End: 2024-06-21

## 2024-06-21 RX ORDER — OXYTOCIN/0.9 % SODIUM CHLORIDE 30/500 ML
250 PLASTIC BAG, INJECTION (ML) INTRAVENOUS CONTINUOUS
Status: DISPENSED | OUTPATIENT
Start: 2024-06-21 | End: 2024-06-22

## 2024-06-21 RX ORDER — OXYTOCIN/0.9 % SODIUM CHLORIDE 30/500 ML
PLASTIC BAG, INJECTION (ML) INTRAVENOUS
Status: COMPLETED
Start: 2024-06-21 | End: 2024-06-21

## 2024-06-21 RX ORDER — FENTANYL/ROPIVACAINE/NS/PF 2MCG/ML-.2
14 PLASTIC BAG, INJECTION (ML) INJECTION CONTINUOUS
Status: DISCONTINUED | OUTPATIENT
Start: 2024-06-21 | End: 2024-06-22

## 2024-06-21 RX ORDER — ACETAMINOPHEN 325 MG/1
650 TABLET ORAL EVERY 4 HOURS PRN
Status: DISCONTINUED | OUTPATIENT
Start: 2024-06-21 | End: 2024-06-22 | Stop reason: HOSPADM

## 2024-06-21 RX ORDER — GLYCERIN/PROPYLENE GLYCOL/WATR
1 SOLUTION, NON-ORAL VAGINAL ONCE AS NEEDED
Status: DISCONTINUED | OUTPATIENT
Start: 2024-06-21 | End: 2024-06-22

## 2024-06-21 RX ORDER — OXYTOCIN/0.9 % SODIUM CHLORIDE 30/500 ML
2-20 PLASTIC BAG, INJECTION (ML) INTRAVENOUS
Status: DISCONTINUED | OUTPATIENT
Start: 2024-06-21 | End: 2024-06-22

## 2024-06-21 RX ORDER — OXYTOCIN/0.9 % SODIUM CHLORIDE 30/500 ML
999 PLASTIC BAG, INJECTION (ML) INTRAVENOUS ONCE
Status: COMPLETED | OUTPATIENT
Start: 2024-06-21 | End: 2024-06-22

## 2024-06-21 RX ORDER — LIDOCAINE HYDROCHLORIDE 10 MG/ML
INJECTION, SOLUTION INFILTRATION; PERINEURAL AS NEEDED
Status: DISCONTINUED | OUTPATIENT
Start: 2024-06-21 | End: 2024-06-22 | Stop reason: SURG

## 2024-06-21 RX ORDER — ONDANSETRON 4 MG/1
4 TABLET, ORALLY DISINTEGRATING ORAL EVERY 6 HOURS PRN
Status: DISCONTINUED | OUTPATIENT
Start: 2024-06-21 | End: 2024-06-22 | Stop reason: HOSPADM

## 2024-06-21 RX ORDER — MINERAL OIL
OIL (ML) MISCELLANEOUS ONCE
Status: DISCONTINUED | OUTPATIENT
Start: 2024-06-21 | End: 2024-06-22 | Stop reason: HOSPADM

## 2024-06-21 RX ORDER — FENTANYL CITRATE 50 UG/ML
100 INJECTION, SOLUTION INTRAMUSCULAR; INTRAVENOUS ONCE
Status: COMPLETED | OUTPATIENT
Start: 2024-06-21 | End: 2024-06-21

## 2024-06-21 RX ORDER — PANTOPRAZOLE SODIUM 40 MG/1
40 TABLET, DELAYED RELEASE ORAL DAILY
Status: DISCONTINUED | OUTPATIENT
Start: 2024-06-21 | End: 2024-06-22

## 2024-06-21 RX ORDER — MAGNESIUM HYDROXIDE 1200 MG/15ML
1000 LIQUID ORAL ONCE AS NEEDED
Status: DISCONTINUED | OUTPATIENT
Start: 2024-06-21 | End: 2024-06-22 | Stop reason: HOSPADM

## 2024-06-21 RX ORDER — CARBOPROST TROMETHAMINE 250 UG/ML
250 INJECTION, SOLUTION INTRAMUSCULAR AS NEEDED
Status: DISCONTINUED | OUTPATIENT
Start: 2024-06-21 | End: 2024-06-22 | Stop reason: HOSPADM

## 2024-06-21 RX ORDER — BUTORPHANOL TARTRATE 1 MG/ML
1 INJECTION, SOLUTION INTRAMUSCULAR; INTRAVENOUS
Status: DISCONTINUED | OUTPATIENT
Start: 2024-06-21 | End: 2024-06-22 | Stop reason: HOSPADM

## 2024-06-21 RX ORDER — SODIUM CHLORIDE, SODIUM LACTATE, POTASSIUM CHLORIDE, CALCIUM CHLORIDE 600; 310; 30; 20 MG/100ML; MG/100ML; MG/100ML; MG/100ML
125 INJECTION, SOLUTION INTRAVENOUS CONTINUOUS
Status: DISCONTINUED | OUTPATIENT
Start: 2024-06-21 | End: 2024-06-22

## 2024-06-21 RX ORDER — EPHEDRINE SULFATE 5 MG/ML
10 INJECTION INTRAVENOUS
Status: DISCONTINUED | OUTPATIENT
Start: 2024-06-21 | End: 2024-06-22 | Stop reason: HOSPADM

## 2024-06-21 RX ORDER — DIPHENHYDRAMINE HYDROCHLORIDE 50 MG/ML
25 INJECTION INTRAMUSCULAR; INTRAVENOUS ONCE AS NEEDED
Status: COMPLETED | OUTPATIENT
Start: 2024-06-21 | End: 2024-06-21

## 2024-06-21 RX ORDER — SODIUM CHLORIDE 9 MG/ML
40 INJECTION, SOLUTION INTRAVENOUS AS NEEDED
Status: DISCONTINUED | OUTPATIENT
Start: 2024-06-21 | End: 2024-06-22 | Stop reason: HOSPADM

## 2024-06-21 RX ORDER — LIDOCAINE HYDROCHLORIDE 20 MG/ML
INJECTION, SOLUTION EPIDURAL; INFILTRATION; INTRACAUDAL; PERINEURAL AS NEEDED
Status: DISCONTINUED | OUTPATIENT
Start: 2024-06-21 | End: 2024-06-22 | Stop reason: SURG

## 2024-06-21 RX ORDER — TERBUTALINE SULFATE 1 MG/ML
0.2 INJECTION, SOLUTION SUBCUTANEOUS AS NEEDED
Status: DISCONTINUED | OUTPATIENT
Start: 2024-06-21 | End: 2024-06-22 | Stop reason: HOSPADM

## 2024-06-21 RX ORDER — CYCLOBENZAPRINE HCL 10 MG
10 TABLET ORAL DAILY PRN
COMMUNITY
End: 2024-06-24 | Stop reason: HOSPADM

## 2024-06-21 RX ORDER — FAMOTIDINE 10 MG/ML
20 INJECTION, SOLUTION INTRAVENOUS ONCE AS NEEDED
Status: DISCONTINUED | OUTPATIENT
Start: 2024-06-21 | End: 2024-06-22

## 2024-06-21 RX ORDER — MISOPROSTOL 100 UG/1
600 TABLET ORAL AS NEEDED
Status: DISCONTINUED | OUTPATIENT
Start: 2024-06-21 | End: 2024-06-22 | Stop reason: HOSPADM

## 2024-06-21 RX ORDER — METHYLERGONOVINE MALEATE 0.2 MG/ML
200 INJECTION INTRAVENOUS ONCE AS NEEDED
Status: COMPLETED | OUTPATIENT
Start: 2024-06-21 | End: 2024-06-22

## 2024-06-21 RX ORDER — SODIUM CHLORIDE 0.9 % (FLUSH) 0.9 %
10 SYRINGE (ML) INJECTION AS NEEDED
Status: DISCONTINUED | OUTPATIENT
Start: 2024-06-21 | End: 2024-06-22 | Stop reason: HOSPADM

## 2024-06-21 RX ORDER — ONDANSETRON 2 MG/ML
4 INJECTION INTRAMUSCULAR; INTRAVENOUS EVERY 6 HOURS PRN
Status: DISCONTINUED | OUTPATIENT
Start: 2024-06-21 | End: 2024-06-22 | Stop reason: HOSPADM

## 2024-06-21 RX ORDER — PRENATAL VIT/IRON FUM/FOLIC AC 27MG-0.8MG
1 TABLET ORAL DAILY
Status: DISCONTINUED | OUTPATIENT
Start: 2024-06-22 | End: 2024-06-22

## 2024-06-21 RX ORDER — CYCLOBENZAPRINE HCL 10 MG
10 TABLET ORAL DAILY PRN
Status: DISCONTINUED | OUTPATIENT
Start: 2024-06-21 | End: 2024-06-22

## 2024-06-21 RX ADMIN — DIPHENHYDRAMINE HYDROCHLORIDE 25 MG: 50 INJECTION, SOLUTION INTRAMUSCULAR; INTRAVENOUS at 22:10

## 2024-06-21 RX ADMIN — Medication 14 ML/HR: at 23:16

## 2024-06-21 RX ADMIN — SODIUM CHLORIDE, POTASSIUM CHLORIDE, SODIUM LACTATE AND CALCIUM CHLORIDE 125 ML/HR: 600; 310; 30; 20 INJECTION, SOLUTION INTRAVENOUS at 20:13

## 2024-06-21 RX ADMIN — ONDANSETRON 4 MG: 2 INJECTION INTRAMUSCULAR; INTRAVENOUS at 23:22

## 2024-06-21 RX ADMIN — FENTANYL CITRATE 100 MCG: 50 INJECTION INTRAMUSCULAR; INTRAVENOUS at 17:39

## 2024-06-21 RX ADMIN — EPHEDRINE SULFATE 10 MG: 5 INJECTION INTRAVENOUS at 18:50

## 2024-06-21 RX ADMIN — EPHEDRINE SULFATE 10 MG: 5 INJECTION INTRAVENOUS at 19:15

## 2024-06-21 RX ADMIN — EPHEDRINE SULFATE 10 MG: 5 INJECTION INTRAVENOUS at 20:13

## 2024-06-21 RX ADMIN — Medication 14 ML/HR: at 17:40

## 2024-06-21 RX ADMIN — LIDOCAINE HYDROCHLORIDE 4 ML: 20 INJECTION, SOLUTION EPIDURAL; INFILTRATION; INTRACAUDAL; PERINEURAL at 17:39

## 2024-06-21 RX ADMIN — LIDOCAINE HYDROCHLORIDE 3 ML: 10 INJECTION, SOLUTION INFILTRATION; PERINEURAL at 17:33

## 2024-06-21 RX ADMIN — SODIUM CHLORIDE, POTASSIUM CHLORIDE, SODIUM LACTATE AND CALCIUM CHLORIDE 125 ML/HR: 600; 310; 30; 20 INJECTION, SOLUTION INTRAVENOUS at 18:41

## 2024-06-21 RX ADMIN — SODIUM CHLORIDE, POTASSIUM CHLORIDE, SODIUM LACTATE AND CALCIUM CHLORIDE 1000 ML: 600; 310; 30; 20 INJECTION, SOLUTION INTRAVENOUS at 17:29

## 2024-06-21 RX ADMIN — Medication 2 MILLI-UNITS/MIN: at 17:48

## 2024-06-21 RX ADMIN — SODIUM CHLORIDE, POTASSIUM CHLORIDE, SODIUM LACTATE AND CALCIUM CHLORIDE 1000 ML: 600; 310; 30; 20 INJECTION, SOLUTION INTRAVENOUS at 16:08

## 2024-06-21 RX ADMIN — EPHEDRINE SULFATE 10 MG: 5 INJECTION INTRAVENOUS at 23:18

## 2024-06-21 NOTE — H&P
JIMBO Mendiola  Obstetric History and Physical    Chief Complaint   Patient presents with    Leaking Fluid       Subjective     Patient is a 38 y.o. female  currently at 38w1d, who presents with labor with SROM.    Her prenatal care is benign.  Her previous obstetric/gynecological history is noted for is non-contributory.    The following portions of the patient's history were reviewed and updated as appropriate: current medications, allergies, past medical history, past surgical history, past family history, past social history, and problem list .   Social History     Socioeconomic History    Marital status:    Tobacco Use    Smoking status: Never    Smokeless tobacco: Never   Vaping Use    Vaping status: Never Used   Substance and Sexual Activity    Alcohol use: No    Drug use: No    Sexual activity: Yes     Partners: Male     Comment: 2 sons works at , currently pregnant     Past Medical History:   Diagnosis Date    Acid reflux     Anxiety        Current Facility-Administered Medications:     acetaminophen (TYLENOL) tablet 650 mg, 650 mg, Oral, Q4H PRN, Sboia Card DO    butorphanol (STADOL) injection 1 mg, 1 mg, Intravenous, Q2H PRN, Sobia Card DO    butorphanol (STADOL) injection 2 mg, 2 mg, Intravenous, Q3H PRN, Sobia Card DO    cyclobenzaprine (FLEXERIL) tablet 10 mg, 10 mg, Oral, Daily PRN, Sobia Card DO    ePHEDrine Sulfate (Pressors) 5 MG/ML injection 10 mg, 10 mg, Intravenous, Q10 Min PRN, Gage Lopez CRNA, 10 mg at 24 191    fentaNYL 2mcg/mL and ropivacaine 0.2% in NS epidural 100mL, 14 mL/hr, Epidural, Continuous, Gage Lopez CRNA, Last Rate: 14 mL/hr at 24 1740, 14 mL/hr at 24 1740    lactated ringers infusion, 125 mL/hr, Intravenous, Continuous, Sobia Card DO, Last Rate: 125 mL/hr at 24 1841, 125 mL/hr at 24 184    mineral oil, , Topical, Once,  Sobia Card DO    ondansetron ODT (ZOFRAN-ODT) disintegrating tablet 4 mg, 4 mg, Oral, Q6H PRN **OR** ondansetron (ZOFRAN) injection 4 mg, 4 mg, Intravenous, Q6H PRN, Sobia Card DO    oxytocin (PITOCIN) 30 units in 0.9% sodium chloride 500 mL (premix), 2-20 bobby-units/min, Intravenous, Titrated, Sobia Card DO, Last Rate: 4 mL/hr at 06/21/24 1830, 4 bobby-units/min at 06/21/24 1830    pantoprazole (PROTONIX) EC tablet 40 mg, 40 mg, Oral, Daily, Sobia Card DO    [START ON 6/22/2024] prenatal vitamin tablet 1 tablet, 1 tablet, Oral, Daily, Sobia Card DO    sodium chloride (NS) irrigation solution 1,000 mL, 1,000 mL, Irrigation, Once PRN, Sobia Card DO    sodium chloride 0.9 % flush 10 mL, 10 mL, Intravenous, PRN, Sobia Card DO    sodium chloride 0.9 % infusion 40 mL, 40 mL, Intravenous, PRN, Sobia Card DO    terbutaline (BRETHINE) injection 0.2 mg, 0.2 mg, Subcutaneous, PRN, Sobia Card DO    Facility-Administered Medications Ordered in Other Encounters:     lidocaine (XYLOCAINE) 1 % injection, , Infiltration, PRN, Gage Lopez CRNA, 3 mL at 06/21/24 1733    lidocaine PF 2% (XYLOCAINE) injection, , Epidural, PRN, Gage Lopez CRNA, 4 mL at 06/21/24 1739  No Known Allergies  Past Surgical History:   Procedure Laterality Date    ADENOIDECTOMY      TONSILLECTOMY           Prenatal Information:   Maternal Prenatal Labs  Blood Type ABO Type   Date Value Ref Range Status   06/21/2024 B  Final      Rh Status RH type   Date Value Ref Range Status   06/21/2024 Positive  Final      Antibody Screen Antibody Screen   Date Value Ref Range Status   06/21/2024 Negative  Final      Rapid Urine Drug Screen Barbiturates Screen, Urine   Date Value Ref Range Status   06/21/2024 Negative Negative Final     Benzodiazepine Screen, Urine   Date Value Ref Range Status  "  06/21/2024 Negative Negative Final     Methadone Screen, Urine   Date Value Ref Range Status   06/21/2024 Negative Negative Final     Opiate Screen   Date Value Ref Range Status   06/21/2024 Negative Negative Final     THC, Screen, Urine   Date Value Ref Range Status   06/21/2024 Negative Negative Final     Cocaine Screen, Urine   Date Value Ref Range Status   06/21/2024 Negative Negative Final     Amphetamine Screen, Urine   Date Value Ref Range Status   06/21/2024 Negative Negative Final     Buprenorphine, Screen, Urine   Date Value Ref Range Status   06/21/2024 Negative Negative Final     Methamphetamine, Ur   Date Value Ref Range Status   06/21/2024 Negative Negative Final     Oxycodone Screen, Urine   Date Value Ref Range Status   06/21/2024 Negative Negative Final     Tricyclic Antidepressants Screen   Date Value Ref Range Status   06/21/2024 Negative Negative Final      Group B Strep Culture No results found for: \"GBSANTIGEN\"           External Prenatal Results       Pregnancy Outside Results - Transcribed From Office Records - See Scanned Records For Details       Test Value Date Time    ABO  B  06/21/24 1821    Rh  Positive  06/21/24 1821    Antibody Screen  Negative  06/21/24 1645      ^ Negative  11/21/23     Varicella IgG       Rubella ^ Immune  11/21/23     Hgb  11.7 g/dL 06/21/24 1645    Hct  35.7 % 06/21/24 1645    HgB A1c        1h GTT       3h GTT Fasting       3h GTT 1 hour       3h GTT 2 hour       3h GTT 3 hour        Gonorrhea (discrete)       Chlamydia (discrete)       RPR ^ Non-Reactive  11/21/23     Syphilis Antibody       HBsAg ^ Negative  11/21/23     Herpes Simplex Virus PCR       Herpes Simplex VIrus Culture       HIV ^ Non-Reactive  11/21/23     Hep C RNA Quant PCR       Hep C Antibody       AFP       NIPT       Cystic Fibroisis        Group B Strep       GBS Susceptibility to Clindamycin       GBS Susceptibility to Erythromycin       Fetal Fibronectin       Genetic Testing, Maternal " Blood                 Drug Screening       Test Value Date Time    Urine Drug Screen       Amphetamine Screen  Negative  24 1615    Barbiturate Screen  Negative  24 1615    Benzodiazepine Screen  Negative  24 1615    Methadone Screen  Negative  24 1615    Phencyclidine Screen  Negative  24 1615    Opiates Screen  Negative  24 1615    THC Screen  Negative  24 1615    Cocaine Screen       Propoxyphene Screen       Buprenorphine Screen  Negative  24 1615    Methamphetamine Screen       Oxycodone Screen  Negative  24 1615    Tricyclic Antidepressants Screen  Negative  24 1615              Legend    ^: Historical                              Past OB History:     OB History    Para Term  AB Living   3 2 2 0 0 0   SAB IAB Ectopic Molar Multiple Live Births   0 0 0 0 0 0      # Outcome Date GA Lbr Luis Alfredo/2nd Weight Sex Type Anes PTL Lv   3 Current            2 Term            1 Term                Past Medical History: Past Medical History:   Diagnosis Date    Acid reflux     Anxiety       Past Surgical History Past Surgical History:   Procedure Laterality Date    ADENOIDECTOMY      TONSILLECTOMY        Family History: Family History   Problem Relation Age of Onset    Anxiety disorder Mother         Generalized anxiety    Arthritis Mother     Asthma Mother     COPD Mother     Kidney disease Mother     Anxiety disorder Father         Generalized anxiety    Other Father         Adisions disease    Alcohol abuse Maternal Grandfather     Diabetes Maternal Grandfather     Heart disease Maternal Grandfather     Stroke Maternal Grandfather     Vision loss Maternal Grandfather     Hearing loss Maternal Grandmother     Anxiety disorder Son         Generalized anxiety    Arthritis Maternal Aunt     Cancer Maternal Aunt     Drug abuse Sister     Hypertension Maternal Uncle     Liver disease Maternal Uncle     Miscarriages / Stillbirths Maternal Aunt     Other Son          Celiac disease    Breast cancer Neg Hx       Social History:  reports that she has never smoked. She has never used smokeless tobacco.   reports no history of alcohol use.   reports no history of drug use.        Review of Systems-all negative except as noted in HPI      Objective     Vital Signs Range for the last 24 hours  Temperature: Temp:  [97 °F (36.1 °C)-98 °F (36.7 °C)] 97 °F (36.1 °C)   Temp Source: Temp src: Oral   BP: BP: ()/(42-76) 105/49   Pulse: Heart Rate:  [] 91   Respirations: Resp:  [18] 18   Weight: Weight:  [90.7 kg (200 lb)] 90.7 kg (200 lb)     Physical Examination: General appearance - alert, well appearing, and in no distress, oriented to person, place, and time, normal appearing weight and well hydrated  Mental status - alert, oriented to person, place, and time, normal mood, behavior, speech, dress, motor activity, and thought processes, affect appropriate to mood  Neck - supple, no significant adenopathy  Chest - clear to auscultation, no wheezes, rales or rhonchi, symmetric air entry, no tachypnea, retractions or cyanosis  Heart - normal rate, regular rhythm, normal S1, S2, no murmurs, rubs, clicks or gallops  Abdomen - soft, nontender, gravid uterus, no masses or organomegaly  no rebound tenderness noted,   Pelvic - normal external genitalia, vulva, vagina, cervix, uterus and adnexa  Neurological - alert, oriented, normal speech, no focal findings or movement disorder noted  Musculoskeletal - no joint tenderness, deformity or swelling  Extremities - peripheral pulses normal, no pedal edema, no clubbing or cyanosis  Skin - normal coloration and turgor, no rashes, no suspicious skin lesions noted        Cervix: Exam by:     Dilation:     Effacement:     Station:       Laboratory Results:   Lab Results (last 24 hours)       Procedure Component Value Units Date/Time    Urine Drug Screen - Urine, Clean Catch [[id number]]  (Normal) Collected: 06/21/24 7473    Specimen: Urine,  Clean Catch Updated: 06/21/24 1734     THC, Screen, Urine Negative     Phencyclidine (PCP), Urine Negative     Cocaine Screen, Urine Negative     Methamphetamine, Ur Negative     Opiate Screen Negative     Amphetamine Screen, Urine Negative     Benzodiazepine Screen, Urine Negative     Tricyclic Antidepressants Screen Negative     Methadone Screen, Urine Negative     Barbiturates Screen, Urine Negative     Oxycodone Screen, Urine Negative     Buprenorphine, Screen, Urine Negative    Narrative:      Cutoff For Drugs Screened:    Amphetamines               500 ng/ml  Barbiturates               200 ng/ml  Benzodiazepines            150 ng/ml  Cocaine                    150 ng/ml  Methadone                  200 ng/ml  Opiates                    100 ng/ml  Phencyclidine               25 ng/ml  THC                         50 ng/ml  Methamphetamine            500 ng/ml  Tricyclic Antidepressants  300 ng/ml  Oxycodone                  100 ng/ml  Buprenorphine               10 ng/ml    The normal value for all drugs tested is negative. This report includes unconfirmed screening results, with the cutoff values listed, to be used for medical treatment purposes only.  Unconfirmed results must not be used for non-medical purposes such as employment or legal testing.  Clinical consideration should be applied to any drug of abuse test, particularly when unconfirmed results are used.      Fentanyl, Urine - Urine, Clean Catch [[id number]]  (Normal) Collected: 06/21/24 1615    Specimen: Urine, Clean Catch Updated: 06/21/24 1733     Fentanyl, Urine Negative    Narrative:      Negative Threshold:      Fentanyl 5 ng/mL     The normal value for the drug tested is negative. This report includes final unconfirmed screening results to be used for medical treatment purposes only. Unconfirmed results must not be used for non-medical purposes such as employment or legal testing. Clinical consideration should be applied to any drug of abuse  test, particularly when unconfirmed results are used.           CBC & Differential [[id number]]  (Abnormal) Collected: 06/21/24 1645    Specimen: Blood Updated: 06/21/24 1723    Narrative:      The following orders were created for panel order CBC & Differential.  Procedure                               Abnormality         Status                     ---------                               -----------         ------                     CBC Auto Differential[[id number]]        Abnormal            Final result                 Please view results for these tests on the individual orders.    CBC Auto Differential [[id number]]  (Abnormal) Collected: 06/21/24 1645    Specimen: Blood Updated: 06/21/24 1723     WBC 11.04 10*3/mm3      RBC 4.09 10*6/mm3      Hemoglobin 11.7 g/dL      Hematocrit 35.7 %      MCV 87.3 fL      MCH 28.6 pg      MCHC 32.8 g/dL      RDW 12.7 %      RDW-SD 40.3 fl      MPV 10.6 fL      Platelets 237 10*3/mm3      Neutrophil % 74.5 %      Lymphocyte % 17.8 %      Monocyte % 6.1 %      Eosinophil % 0.5 %      Basophil % 0.2 %      Immature Grans % 0.9 %      Neutrophils, Absolute 8.22 10*3/mm3      Lymphocytes, Absolute 1.97 10*3/mm3      Monocytes, Absolute 0.67 10*3/mm3      Eosinophils, Absolute 0.06 10*3/mm3      Basophils, Absolute 0.02 10*3/mm3      Immature Grans, Absolute 0.10 10*3/mm3      nRBC 0.0 /100 WBC     T Pallidum Antibody w/ reflex RPR (Syphilis) [[id number]] Collected: 06/21/24 1645    Specimen: Blood Updated: 06/21/24 1720    RPR [[id number]] Resulted: 11/21/23    Specimen: Blood Updated: 06/21/24 1553     External RPR Non-Reactive    Rubella Antibody, IgG [[id number]] Resulted: 11/21/23    Specimen: Blood Updated: 06/21/24 1553     External Rubella Qual Immune    HIV-1 Antibody, EIA [[id number]] Resulted: 11/21/23    Specimen: Blood Updated: 06/21/24 1553     External HIV Antibody Non-Reactive    Group B Streptococcus Culture - Swab, Vaginal/Rectum [[id number]] Resulted: 06/07/23     "Specimen: Swab from Vaginal/Rectum Updated: 06/21/24 1553     External Strep Group B Ag Negative    Hepatitis B Surface Antigen [[id number]] Resulted: 11/21/23    Specimen: Blood Updated: 06/21/24 1553     External Hepatitis B Surface Ag Negative          Radiology Review:   Imaging Results (Last 72 Hours)       ** No results found for the last 72 hours. **              Assessment & Plan       Amniotic fluid leaking    Normal labor      Assessment & Plan    Assessment:  1.  Intrauterine pregnancy at 38w1d weeks gestation with reassuring fetal status.    2.  labor  with ROM  3.  Obstetrical history significant for is noncontributory.  4.  GBS status: No results found for: \"GBSANTIGEN\"    Plan:  1. fetal and uterine monitoring  continuously and labor augmentation  Pitocin  2. Plan of care has been reviewed with patient   3.  Risks, benefits of treatment plan have been discussed.  4.  All questions have been answered.        Sobia Card,   6/21/2024  19:55 EDT    "

## 2024-06-21 NOTE — ANESTHESIA PROCEDURE NOTES
Labor Epidural      Patient reassessed immediately prior to procedure    Patient location during procedure: OB  Start Time: 6/21/2024 5:32 PM  Stop Time: 6/21/2024 5:36 PM  Indication:at surgeon's request  Performed By  CRNA/RASHAWN: Gage Lopez CRNA  Preanesthetic Checklist  Completed: patient identified, IV checked, site marked, risks and benefits discussed, surgical consent, monitors and equipment checked, pre-op evaluation and timeout performed  Prep:  Pt Position:sitting  Sterile Tech:cap, gloves, mask and sterile barrier  Prep:povidone-iodine 7.5% surgical scrub  Monitoring:blood pressure monitoring and continuous pulse oximetry  Epidural Block Procedure:  Approach:midline  Guidance:landmark technique  Location:L3-L4  Needle Type:Tuohy  Needle Gauge:18 G  Loss of Resistance Medium: saline  Loss of Resistance: 7cm  Cath Depth at skin:14 cm  Paresthesia: none  Aspiration:negative  Test Dose:negative  Number of Attempts: 1  Post Assessment:  Dressing:secured with tape and occlusive dressing applied  Pt Tolerance:patient tolerated the procedure well with no apparent complications  Complications:no

## 2024-06-21 NOTE — ANESTHESIA PREPROCEDURE EVALUATION
Anesthesia Evaluation     no history of anesthetic complications:                Airway   Mallampati: I  TM distance: >3 FB  Neck ROM: full  No difficulty expected  Dental - normal exam     Pulmonary - normal exam   Cardiovascular - normal exam        Neuro/Psych  (+) psychiatric history  GI/Hepatic/Renal/Endo    (+) GERD    Musculoskeletal     Abdominal  - normal exam    Bowel sounds: normal.   Substance History      OB/GYN    (+) Pregnant        Other                          Anesthesia Plan    ASA 2     epidural       Anesthetic plan, risks, benefits, and alternatives have been provided, discussed and informed consent has been obtained with: patient.        CODE STATUS:    Level Of Support Discussed With: Patient  Code Status (Patient has no pulse and is not breathing): CPR (Attempt to Resuscitate)  Medical Interventions (Patient has pulse or is breathing): Full

## 2024-06-21 NOTE — NON STRESS TEST
Sirisha Bowie, a  at 38w1d with an WHITNEY of 2024, by Ultrasound, was seen at [facility] LABOR DELIVERY for a nonstress test.    Chief Complaint   Patient presents with    Leaking Fluid       Patient Active Problem List   Diagnosis    Pregnancy    Gastroesophageal reflux disease without esophagitis    Amniotic fluid leaking    Normal labor       Start Time: 1552  Stop Time: 1620    Interpretation A  Nonstress Test Interpretation A: Reactive  Comments A: KELLY Hare

## 2024-06-22 LAB
HCT VFR BLD AUTO: 32.9 % (ref 34–46.6)
HGB BLD-MCNC: 10.8 G/DL (ref 12–15.9)
T PALLIDUM IGG SER QL: NORMAL

## 2024-06-22 PROCEDURE — 0KQM0ZZ REPAIR PERINEUM MUSCLE, OPEN APPROACH: ICD-10-PCS | Performed by: OBSTETRICS & GYNECOLOGY

## 2024-06-22 PROCEDURE — 25010000002 METHYLERGONOVINE MALEATE PER 0.2 MG: Performed by: OBSTETRICS & GYNECOLOGY

## 2024-06-22 PROCEDURE — 85014 HEMATOCRIT: CPT | Performed by: OBSTETRICS & GYNECOLOGY

## 2024-06-22 PROCEDURE — 85018 HEMOGLOBIN: CPT | Performed by: OBSTETRICS & GYNECOLOGY

## 2024-06-22 RX ORDER — HYDROCORTISONE ACETATE PRAMOXINE HCL 1; 1 G/100G; G/100G
1 CREAM TOPICAL AS NEEDED
Status: DISCONTINUED | OUTPATIENT
Start: 2024-06-22 | End: 2024-06-24 | Stop reason: HOSPADM

## 2024-06-22 RX ORDER — DOCUSATE SODIUM 100 MG/1
100 CAPSULE, LIQUID FILLED ORAL 2 TIMES DAILY
Status: DISCONTINUED | OUTPATIENT
Start: 2024-06-23 | End: 2024-06-22

## 2024-06-22 RX ORDER — BISACODYL 10 MG
10 SUPPOSITORY, RECTAL RECTAL DAILY PRN
Status: DISCONTINUED | OUTPATIENT
Start: 2024-06-23 | End: 2024-06-24 | Stop reason: HOSPADM

## 2024-06-22 RX ORDER — METHYLERGONOVINE MALEATE 0.2 MG/ML
200 INJECTION INTRAVENOUS ONCE AS NEEDED
Status: DISCONTINUED | OUTPATIENT
Start: 2024-06-22 | End: 2024-06-24 | Stop reason: HOSPADM

## 2024-06-22 RX ORDER — HYDROCODONE BITARTRATE AND ACETAMINOPHEN 5; 325 MG/1; MG/1
1 TABLET ORAL EVERY 4 HOURS PRN
Status: DISCONTINUED | OUTPATIENT
Start: 2024-06-22 | End: 2024-06-22 | Stop reason: HOSPADM

## 2024-06-22 RX ORDER — MISOPROSTOL 100 UG/1
600 TABLET ORAL ONCE AS NEEDED
Status: DISCONTINUED | OUTPATIENT
Start: 2024-06-22 | End: 2024-06-24 | Stop reason: HOSPADM

## 2024-06-22 RX ORDER — HYDROCORTISONE 25 MG/G
1 CREAM TOPICAL AS NEEDED
Status: DISCONTINUED | OUTPATIENT
Start: 2024-06-22 | End: 2024-06-24 | Stop reason: HOSPADM

## 2024-06-22 RX ORDER — ONDANSETRON 2 MG/ML
4 INJECTION INTRAMUSCULAR; INTRAVENOUS EVERY 6 HOURS PRN
Status: DISCONTINUED | OUTPATIENT
Start: 2024-06-22 | End: 2024-06-24 | Stop reason: HOSPADM

## 2024-06-22 RX ORDER — SIMETHICONE 80 MG
80 TABLET,CHEWABLE ORAL 4 TIMES DAILY PRN
Status: DISCONTINUED | OUTPATIENT
Start: 2024-06-22 | End: 2024-06-24 | Stop reason: HOSPADM

## 2024-06-22 RX ORDER — HYDROCODONE BITARTRATE AND ACETAMINOPHEN 5; 325 MG/1; MG/1
1 TABLET ORAL EVERY 4 HOURS PRN
Status: DISCONTINUED | OUTPATIENT
Start: 2024-06-22 | End: 2024-06-24 | Stop reason: HOSPADM

## 2024-06-22 RX ORDER — HYDROCODONE BITARTRATE AND ACETAMINOPHEN 10; 325 MG/1; MG/1
1 TABLET ORAL EVERY 4 HOURS PRN
Status: DISCONTINUED | OUTPATIENT
Start: 2024-06-22 | End: 2024-06-24 | Stop reason: HOSPADM

## 2024-06-22 RX ORDER — ACETAMINOPHEN 325 MG/1
650 TABLET ORAL EVERY 4 HOURS PRN
Status: DISCONTINUED | OUTPATIENT
Start: 2024-06-22 | End: 2024-06-22 | Stop reason: HOSPADM

## 2024-06-22 RX ORDER — SODIUM CHLORIDE 0.9 % (FLUSH) 0.9 %
1-10 SYRINGE (ML) INJECTION AS NEEDED
Status: DISCONTINUED | OUTPATIENT
Start: 2024-06-22 | End: 2024-06-24 | Stop reason: HOSPADM

## 2024-06-22 RX ORDER — HYDROXYZINE HYDROCHLORIDE 25 MG/1
50 TABLET, FILM COATED ORAL NIGHTLY PRN
Status: DISCONTINUED | OUTPATIENT
Start: 2024-06-22 | End: 2024-06-24 | Stop reason: HOSPADM

## 2024-06-22 RX ORDER — IBUPROFEN 800 MG/1
800 TABLET ORAL 3 TIMES DAILY
Status: DISCONTINUED | OUTPATIENT
Start: 2024-06-22 | End: 2024-06-24 | Stop reason: HOSPADM

## 2024-06-22 RX ORDER — OXYTOCIN/0.9 % SODIUM CHLORIDE 30/500 ML
125 PLASTIC BAG, INJECTION (ML) INTRAVENOUS CONTINUOUS PRN
Status: DISCONTINUED | OUTPATIENT
Start: 2024-06-22 | End: 2024-06-24 | Stop reason: HOSPADM

## 2024-06-22 RX ORDER — DOCUSATE SODIUM 100 MG/1
100 CAPSULE, LIQUID FILLED ORAL 2 TIMES DAILY
Status: DISCONTINUED | OUTPATIENT
Start: 2024-06-22 | End: 2024-06-24 | Stop reason: HOSPADM

## 2024-06-22 RX ORDER — CARBOPROST TROMETHAMINE 250 UG/ML
250 INJECTION, SOLUTION INTRAMUSCULAR
Status: DISCONTINUED | OUTPATIENT
Start: 2024-06-22 | End: 2024-06-24 | Stop reason: HOSPADM

## 2024-06-22 RX ORDER — ONDANSETRON 4 MG/1
4 TABLET, ORALLY DISINTEGRATING ORAL EVERY 6 HOURS PRN
Status: DISCONTINUED | OUTPATIENT
Start: 2024-06-22 | End: 2024-06-24 | Stop reason: HOSPADM

## 2024-06-22 RX ORDER — IBUPROFEN 800 MG/1
800 TABLET ORAL EVERY 8 HOURS SCHEDULED
Status: DISCONTINUED | OUTPATIENT
Start: 2024-06-22 | End: 2024-06-22 | Stop reason: HOSPADM

## 2024-06-22 RX ORDER — ACETAMINOPHEN 325 MG/1
650 TABLET ORAL EVERY 6 HOURS PRN
Status: DISCONTINUED | OUTPATIENT
Start: 2024-06-22 | End: 2024-06-24 | Stop reason: HOSPADM

## 2024-06-22 RX ORDER — PRENATAL VIT/IRON FUM/FOLIC AC 27MG-0.8MG
1 TABLET ORAL DAILY
Status: DISCONTINUED | OUTPATIENT
Start: 2024-06-23 | End: 2024-06-24 | Stop reason: HOSPADM

## 2024-06-22 RX ADMIN — HYDROCODONE BITARTRATE AND ACETAMINOPHEN 1 TABLET: 5; 325 TABLET ORAL at 02:45

## 2024-06-22 RX ADMIN — HYDROCORTISONE 2.5% 1 APPLICATION: 25 CREAM TOPICAL at 05:00

## 2024-06-22 RX ADMIN — HYDROCORTISONE 2.5% 1 APPLICATION: 25 CREAM TOPICAL at 20:46

## 2024-06-22 RX ADMIN — Medication 1 APPLICATION: at 20:46

## 2024-06-22 RX ADMIN — HYDROCODONE BITARTRATE AND ACETAMINOPHEN 1 TABLET: 10; 325 TABLET ORAL at 07:41

## 2024-06-22 RX ADMIN — DOCUSATE SODIUM 100 MG: 100 CAPSULE, LIQUID FILLED ORAL at 14:50

## 2024-06-22 RX ADMIN — BENZOCAINE 1 APPLICATION: 5.6 OINTMENT TOPICAL at 20:46

## 2024-06-22 RX ADMIN — WITCH HAZEL: 500 SOLUTION RECTAL; TOPICAL at 20:46

## 2024-06-22 RX ADMIN — BENZOCAINE 1 APPLICATION: 5.6 OINTMENT TOPICAL at 07:41

## 2024-06-22 RX ADMIN — Medication 999 ML/HR: at 00:38

## 2024-06-22 RX ADMIN — BENZOCAINE 1 APPLICATION: 5.6 OINTMENT TOPICAL at 05:00

## 2024-06-22 RX ADMIN — Medication: at 05:00

## 2024-06-22 RX ADMIN — IBUPROFEN 800 MG: 800 TABLET, FILM COATED ORAL at 02:45

## 2024-06-22 RX ADMIN — METHYLERGONOVINE MALEATE 200 MCG: 0.2 INJECTION INTRAVENOUS at 00:43

## 2024-06-22 RX ADMIN — Medication: at 20:46

## 2024-06-22 RX ADMIN — HYDROCORTISONE 2.5% 1 APPLICATION: 25 CREAM TOPICAL at 07:41

## 2024-06-22 RX ADMIN — IBUPROFEN 800 MG: 800 TABLET, FILM COATED ORAL at 20:46

## 2024-06-22 RX ADMIN — HYDROCODONE BITARTRATE AND ACETAMINOPHEN 1 TABLET: 10; 325 TABLET ORAL at 14:47

## 2024-06-22 RX ADMIN — IBUPROFEN 800 MG: 800 TABLET, FILM COATED ORAL at 08:59

## 2024-06-22 RX ADMIN — Medication 250 ML/HR: at 00:38

## 2024-06-22 RX ADMIN — SIMETHICONE 80 MG: 80 TABLET, CHEWABLE ORAL at 20:46

## 2024-06-22 RX ADMIN — IBUPROFEN 800 MG: 800 TABLET, FILM COATED ORAL at 14:47

## 2024-06-22 RX ADMIN — WITCH HAZEL: 500 SOLUTION RECTAL; TOPICAL at 12:43

## 2024-06-22 RX ADMIN — Medication 1 APPLICATION: at 05:00

## 2024-06-22 RX ADMIN — DOCUSATE SODIUM 100 MG: 100 CAPSULE, LIQUID FILLED ORAL at 20:46

## 2024-06-22 RX ADMIN — WITCH HAZEL: 500 SOLUTION RECTAL; TOPICAL at 05:00

## 2024-06-22 NOTE — PAYOR COMM NOTE
"[facility]  CHUCHO CEDILLO  PHONE  [phone]  [phone]  NPI:  [id number]    REQUEST FOR INPATIENT AUTH  DELIVERY NOTIFICATION    6/22/2024 @ 1232  VAGINAL DELIVERY  MALE    APGARS:  7/9  WT:  3630 GMS    Sirisha Carias (38 y.o. Female)       Date of Birth   1985    Social Security Number       Address   [address] [place] [address]    Home Phone   [phone]    MRN   [id number]       Yazidism   Adventism    Marital Status                               Admission Date   6/21/24    Admission Type   Elective    Admitting Provider   Sobia Card DO    Attending Provider   Sobia Card DO    Department, Room/Bed   [facility], W238/1       Discharge Date       Discharge Disposition       Discharge Destination                                 Attending Provider: Sobia Card DO    Allergies: No Known Allergies    Isolation: None   Infection: None   Code Status: CPR    Ht: 163.8 cm (64.5\")   Wt: 90.7 kg (200 lb)    Admission Cmt: None   Principal Problem: Amniotic fluid leaking [O42.90]                   Active Insurance as of 6/21/2024       Primary Coverage       Payor Plan Insurance Group Employer/Plan Group    [employer] [place] [employer] [place]ICA[place]        Payor Plan Address Payor Plan Phone Number Payor Plan Fax Number Effective Dates    [address] [address] [phone]  12/5/2017 - None Entered    [place] [address]         Subscriber Name Subscriber Birth Date Member ID       SIRISHA CARIAS 1985 [id number]                     Emergency Contacts        (Rel.) Home Phone Work Phone Mobile Phone    CINTHIA CARIAS (Spouse) -- -- [phone]                 History & Physical        Sobia Card DO at 06/21/24 [address] [place], [place] [address]  Obstetric History and Physical    Chief Complaint   Patient presents with    Leaking Fluid       Subjective    Patient is a 38 y.o. " female  currently at 38w1d, who presents with labor with SROM.    Her prenatal care is benign.  Her previous obstetric/gynecological history is noted for is non-contributory.    The following portions of the patient's history were reviewed and updated as appropriate: current medications, allergies, past medical history, past surgical history, past family history, past social history, and problem list .   Social History     Socioeconomic History    Marital status:    Tobacco Use    Smoking status: Never    Smokeless tobacco: Never   Vaping Use    Vaping status: Never Used   Substance and Sexual Activity    Alcohol use: No    Drug use: No    Sexual activity: Yes     Partners: Male     Comment: 2 sons works at , currently pregnant     Past Medical History:   Diagnosis Date    Acid reflux     Anxiety        Current Facility-Administered Medications:     acetaminophen (TYLENOL) tablet 650 mg, 650 mg, Oral, Q4H PRN, Sobia Card DO    butorphanol (STADOL) injection 1 mg, 1 mg, Intravenous, Q2H PRN, Sobia Card DO    butorphanol (STADOL) injection 2 mg, 2 mg, Intravenous, Q3H PRN, Sobia Card DO    cyclobenzaprine (FLEXERIL) tablet 10 mg, 10 mg, Oral, Daily PRN, Sobia Card DO    ePHEDrine Sulfate (Pressors) 5 MG/ML injection 10 mg, 10 mg, Intravenous, Q10 Min PRN, Gage Lopez CRNA, 10 mg at 24 191    fentaNYL 2mcg/mL and ropivacaine 0.2% in NS epidural 100mL, 14 mL/hr, Epidural, Continuous, Gage Lopez CRNA, Last Rate: 14 mL/hr at 24 1740, 14 mL/hr at 24 1740    lactated ringers infusion, 125 mL/hr, Intravenous, Continuous, Sobia Card DO, Last Rate: 125 mL/hr at 24 1841, 125 mL/hr at 24 184    mineral oil, , Topical, Once, Sobia Card DO    ondansetron ODT (ZOFRAN-ODT) disintegrating tablet 4 mg, 4 mg, Oral, Q6H PRN **OR** ondansetron (ZOFRAN) injection 4  mg, 4 mg, Intravenous, Q6H PRN, Sobia Card DO    oxytocin (PITOCIN) 30 units in 0.9% sodium chloride 500 mL (premix), 2-20 bobby-units/min, Intravenous, Titrated, Sobia Card DO, Last Rate: 4 mL/hr at 06/21/24 1830, 4 bobby-units/min at 06/21/24 1830    pantoprazole (PROTONIX) EC tablet 40 mg, 40 mg, Oral, Daily, Sobia Card DO    [START ON 6/22/2024] prenatal vitamin tablet 1 tablet, 1 tablet, Oral, Daily, Sobia Card DO    sodium chloride (NS) irrigation solution 1,000 mL, 1,000 mL, Irrigation, Once PRN, Sobia Card DO    sodium chloride 0.9 % flush 10 mL, 10 mL, Intravenous, PRN, Sobia Card DO    sodium chloride 0.9 % infusion 40 mL, 40 mL, Intravenous, PRN, Sobia Card DO    terbutaline (BRETHINE) injection 0.2 mg, 0.2 mg, Subcutaneous, PRN, Sobia Card DO    Facility-Administered Medications Ordered in Other Encounters:     lidocaine (XYLOCAINE) 1 % injection, , Infiltration, PRN, Gage Lopez CRNA, 3 mL at 06/21/24 1733    lidocaine PF 2% (XYLOCAINE) injection, , Epidural, PRN, Gage Lopez CRNA, 4 mL at 06/21/24 1739  No Known Allergies  Past Surgical History:   Procedure Laterality Date    ADENOIDECTOMY      TONSILLECTOMY           Prenatal Information:   Maternal Prenatal Labs  Blood Type ABO Type   Date Value Ref Range Status   06/21/2024 B  Final      Rh Status RH type   Date Value Ref Range Status   06/21/2024 Positive  Final      Antibody Screen Antibody Screen   Date Value Ref Range Status   06/21/2024 Negative  Final      Rapid Urine Drug Screen Barbiturates Screen, Urine   Date Value Ref Range Status   06/21/2024 Negative Negative Final     Benzodiazepine Screen, Urine   Date Value Ref Range Status   06/21/2024 Negative Negative Final     Methadone Screen, Urine   Date Value Ref Range Status   06/21/2024 Negative Negative Final     Opiate Screen   Date  "Value Ref Range Status   06/21/2024 Negative Negative Final     THC, Screen, Urine   Date Value Ref Range Status   06/21/2024 Negative Negative Final     Cocaine Screen, Urine   Date Value Ref Range Status   06/21/2024 Negative Negative Final     Amphetamine Screen, Urine   Date Value Ref Range Status   06/21/2024 Negative Negative Final     Buprenorphine, Screen, Urine   Date Value Ref Range Status   06/21/2024 Negative Negative Final     Methamphetamine, Ur   Date Value Ref Range Status   06/21/2024 Negative Negative Final     Oxycodone Screen, Urine   Date Value Ref Range Status   06/21/2024 Negative Negative Final     Tricyclic Antidepressants Screen   Date Value Ref Range Status   06/21/2024 Negative Negative Final      Group B Strep Culture No results found for: \"GBSANTIGEN\"           External Prenatal Results       Pregnancy Outside Results - Transcribed From Office Records - See Scanned Records For Details       Test Value Date Time    ABO  B  06/21/24 1821    Rh  Positive  06/21/24 1821    Antibody Screen  Negative  06/21/24 1645      ^ Negative  11/21/23     Varicella IgG       Rubella ^ Immune  11/21/23     Hgb  11.7 g/dL 06/21/24 1645    Hct  35.7 % 06/21/24 1645    HgB A1c        1h GTT       3h GTT Fasting       3h GTT 1 hour       3h GTT 2 hour       3h GTT 3 hour        Gonorrhea (discrete)       Chlamydia (discrete)       RPR ^ Non-Reactive  11/21/23     Syphilis Antibody       HBsAg ^ Negative  11/21/23     Herpes Simplex Virus PCR       Herpes Simplex VIrus Culture       HIV ^ Non-Reactive  11/21/23     Hep C RNA Quant PCR       Hep C Antibody       AFP       NIPT       Cystic Fibroisis        Group B Strep       GBS Susceptibility to Clindamycin       GBS Susceptibility to Erythromycin       Fetal Fibronectin       Genetic Testing, Maternal Blood                 Drug Screening       Test Value Date Time    Urine Drug Screen       Amphetamine Screen  Negative  06/21/24 1615    Barbiturate Screen  " Negative  24 1615    Benzodiazepine Screen  Negative  24 1615    Methadone Screen  Negative  24 1615    Phencyclidine Screen  Negative  24 1615    Opiates Screen  Negative  24 1615    THC Screen  Negative  24 1615    Cocaine Screen       Propoxyphene Screen       Buprenorphine Screen  Negative  24 1615    Methamphetamine Screen       Oxycodone Screen  Negative  24 1615    Tricyclic Antidepressants Screen  Negative  24 1615              Legend    ^: Historical                              Past OB History:     OB History    Para Term  AB Living   3 2 2 0 0 0   SAB IAB Ectopic Molar Multiple Live Births   0 0 0 0 0 0      # Outcome Date GA Lbr Luis Alfredo/2nd Weight Sex Type Anes PTL Lv   3 Current            2 Term            1 Term                Past Medical History: Past Medical History:   Diagnosis Date    Acid reflux     Anxiety       Past Surgical History Past Surgical History:   Procedure Laterality Date    ADENOIDECTOMY      TONSILLECTOMY        Family History: Family History   Problem Relation Age of Onset    Anxiety disorder Mother         Generalized anxiety    Arthritis Mother     Asthma Mother     COPD Mother     Kidney disease Mother     Anxiety disorder Father         Generalized anxiety    Other Father         Adisions disease    Alcohol abuse Maternal Grandfather     Diabetes Maternal Grandfather     Heart disease Maternal Grandfather     Stroke Maternal Grandfather     Vision loss Maternal Grandfather     Hearing loss Maternal Grandmother     Anxiety disorder Son         Generalized anxiety    Arthritis Maternal Aunt     Cancer Maternal Aunt     Drug abuse Sister     Hypertension Maternal Uncle     Liver disease Maternal Uncle     Miscarriages / Stillbirths Maternal Aunt     Other Son         Celiac disease    Breast cancer Neg Hx       Social History:  reports that she has never smoked. She has never used smokeless tobacco.   reports no  history of alcohol use.   reports no history of drug use.        Review of Systems-all negative except as noted in HPI      Objective    Vital Signs Range for the last 24 hours  Temperature: Temp:  [97 °F (36.1 °C)-98 °F (36.7 °C)] 97 °F (36.1 °C)   Temp Source: Temp src: Oral   BP: BP: ()/(42-76) 105/49   Pulse: Heart Rate:  [] 91   Respirations: Resp:  [18] 18   Weight: Weight:  [90.7 kg (200 lb)] 90.7 kg (200 lb)     Physical Examination: General appearance - alert, well appearing, and in no distress, oriented to person, place, and time, normal appearing weight and well hydrated  Mental status - alert, oriented to person, place, and time, normal mood, behavior, speech, dress, motor activity, and thought processes, affect appropriate to mood  Neck - supple, no significant adenopathy  Chest - clear to auscultation, no wheezes, rales or rhonchi, symmetric air entry, no tachypnea, retractions or cyanosis  Heart - normal rate, regular rhythm, normal S1, S2, no murmurs, rubs, clicks or gallops  Abdomen - soft, nontender, gravid uterus, no masses or organomegaly  no rebound tenderness noted,   Pelvic - normal external genitalia, vulva, vagina, cervix, uterus and adnexa  Neurological - alert, oriented, normal speech, no focal findings or movement disorder noted  Musculoskeletal - no joint tenderness, deformity or swelling  Extremities - peripheral pulses normal, no pedal edema, no clubbing or cyanosis  Skin - normal coloration and turgor, no rashes, no suspicious skin lesions noted        Cervix: Exam by:     Dilation:     Effacement:     Station:       Laboratory Results:   Lab Results (last 24 hours)       Procedure Component Value Units Date/Time    Urine Drug Screen - Urine, Clean Catch [[id number]]  (Normal) Collected: 06/21/24 1615    Specimen: Urine, Clean Catch Updated: 06/21/24 1734     THC, Screen, Urine Negative     Phencyclidine (PCP), Urine Negative     Cocaine Screen, Urine Negative      Methamphetamine, Ur Negative     Opiate Screen Negative     Amphetamine Screen, Urine Negative     Benzodiazepine Screen, Urine Negative     Tricyclic Antidepressants Screen Negative     Methadone Screen, Urine Negative     Barbiturates Screen, Urine Negative     Oxycodone Screen, Urine Negative     Buprenorphine, Screen, Urine Negative    Narrative:      Cutoff For Drugs Screened:    Amphetamines               500 ng/ml  Barbiturates               200 ng/ml  Benzodiazepines            150 ng/ml  Cocaine                    150 ng/ml  Methadone                  200 ng/ml  Opiates                    100 ng/ml  Phencyclidine               25 ng/ml  THC                         50 ng/ml  Methamphetamine            500 ng/ml  Tricyclic Antidepressants  300 ng/ml  Oxycodone                  100 ng/ml  Buprenorphine               10 ng/ml    The normal value for all drugs tested is negative. This report includes unconfirmed screening results, with the cutoff values listed, to be used for medical treatment purposes only.  Unconfirmed results must not be used for non-medical purposes such as employment or legal testing.  Clinical consideration should be applied to any drug of abuse test, particularly when unconfirmed results are used.      Fentanyl, Urine - Urine, Clean Catch [[id number]]  (Normal) Collected: 06/21/24 1615    Specimen: Urine, Clean Catch Updated: 06/21/24 1733     Fentanyl, Urine Negative    Narrative:      Negative Threshold:      Fentanyl 5 ng/mL     The normal value for the drug tested is negative. This report includes final unconfirmed screening results to be used for medical treatment purposes only. Unconfirmed results must not be used for non-medical purposes such as employment or legal testing. Clinical consideration should be applied to any drug of abuse test, particularly when unconfirmed results are used.           CBC & Differential [[id number]]  (Abnormal) Collected: 06/21/24 1645    Specimen: Blood  Updated: 06/21/24 1723    Narrative:      The following orders were created for panel order CBC & Differential.  Procedure                               Abnormality         Status                     ---------                               -----------         ------                     CBC Auto Differential[[id number]]        Abnormal            Final result                 Please view results for these tests on the individual orders.    CBC Auto Differential [[id number]]  (Abnormal) Collected: 06/21/24 1645    Specimen: Blood Updated: 06/21/24 1723     WBC 11.04 10*3/mm3      RBC 4.09 10*6/mm3      Hemoglobin 11.7 g/dL      Hematocrit 35.7 %      MCV 87.3 fL      MCH 28.6 pg      MCHC 32.8 g/dL      RDW 12.7 %      RDW-SD 40.3 fl      MPV 10.6 fL      Platelets 237 10*3/mm3      Neutrophil % 74.5 %      Lymphocyte % 17.8 %      Monocyte % 6.1 %      Eosinophil % 0.5 %      Basophil % 0.2 %      Immature Grans % 0.9 %      Neutrophils, Absolute 8.22 10*3/mm3      Lymphocytes, Absolute 1.97 10*3/mm3      Monocytes, Absolute 0.67 10*3/mm3      Eosinophils, Absolute 0.06 10*3/mm3      Basophils, Absolute 0.02 10*3/mm3      Immature Grans, Absolute 0.10 10*3/mm3      nRBC 0.0 /100 WBC     T Pallidum Antibody w/ reflex RPR (Syphilis) [[id number]] Collected: 06/21/24 1645    Specimen: Blood Updated: 06/21/24 1720    RPR [[id number]] Resulted: 11/21/23    Specimen: Blood Updated: 06/21/24 1553     External RPR Non-Reactive    Rubella Antibody, IgG [[id number]] Resulted: 11/21/23    Specimen: Blood Updated: 06/21/24 1553     External Rubella Qual Immune    HIV-1 Antibody, EIA [[id number]] Resulted: 11/21/23    Specimen: Blood Updated: 06/21/24 1553     External HIV Antibody Non-Reactive    Group B Streptococcus Culture - Swab, Vaginal/Rectum [[id number]] Resulted: 06/07/23    Specimen: Swab from Vaginal/Rectum Updated: 06/21/24 1553     External Strep Group B Ag Negative    Hepatitis B Surface Antigen [[id number]] Resulted:  "11/21/23    Specimen: Blood Updated: 06/21/24 1553     External Hepatitis B Surface Ag Negative          Radiology Review:   Imaging Results (Last 72 Hours)       ** No results found for the last 72 hours. **              Assessment & Plan      Amniotic fluid leaking    Normal labor      Assessment & Plan    Assessment:  1.  Intrauterine pregnancy at 38w1d weeks gestation with reassuring fetal status.    2.  labor  with ROM  3.  Obstetrical history significant for is noncontributory.  4.  GBS status: No results found for: \"GBSANTIGEN\"    Plan:  1. fetal and uterine monitoring  continuously and labor augmentation  Pitocin  2. Plan of care has been reviewed with patient   3.  Risks, benefits of treatment plan have been discussed.  4.  All questions have been answered.        Sobia Card DO  6/21/2024  19:55 EDT      Electronically signed by Sobia Card DO at 06/21/24 1957       Current Facility-Administered Medications   Medication Dose Route Frequency Provider Last Rate Last Admin    acetaminophen (TYLENOL) tablet 650 mg  650 mg Oral Q6H PRN Sobia Card DO        benzocaine (AMERICAINE) 20 % rectal ointment 1 Application  1 Application Rectal PRN Sobia Card DO   1 Application at 06/22/24 0500    benzocaine-menthol (DERMOPLAST) 20-0.5 % topical spray   Topical PRN Sobia Card DO   Given at 06/22/24 0500    [START ON 6/23/2024] bisacodyl (DULCOLAX) suppository 10 mg  10 mg Rectal Daily PRN Sobia Card DO        carboprost (HEMABATE) injection 250 mcg  250 mcg Intramuscular Q30 Min PRN Sobia Card DO        [START ON 6/23/2024] docusate sodium (COLACE) capsule 100 mg  100 mg Oral BID Sobia Card DO        HYDROcodone-acetaminophen (NORCO) 5-325 MG per tablet 1 tablet  1 tablet Oral Q4H PRN Sobia Card DO        Or    HYDROcodone-acetaminophen (NORCO)  MG per tablet 1 tablet  1 tablet Oral " Q4H PRN Sobia Card DO        Hydrocortisone (Perianal) (ANUSOL-HC) 2.5 % rectal cream 1 Application  1 Application Rectal PRN Sobia Card DO   1 Application at 06/22/24 0500    Hydrocortisone Ace-Pramoxine 1-1 % rectal cream 1 Application  1 Application Rectal PRN Sobia Card DO        hydrOXYzine (ATARAX) tablet 50 mg  50 mg Oral Nightly PRN Sobia Card DO        ibuprofen (ADVIL,MOTRIN) tablet 800 mg  800 mg Oral TID Sobia Card DO        lanolin topical 1 Application  1 Application Topical Q1H PRN Sobia Card DO   1 Application at 06/22/24 0500    magnesium hydroxide (MILK OF MAGNESIA) suspension 10 mL  10 mL Oral Daily PRN Sobia Card DO        methylergonovine (METHERGINE) injection 200 mcg  200 mcg Intramuscular Once PRN Sobia Card DO        miSOPROStol (CYTOTEC) tablet 600 mcg  600 mcg Oral Once PRN Sobia Card DO        ondansetron ODT (ZOFRAN-ODT) disintegrating tablet 4 mg  4 mg Oral Q6H PRN Sobia Card DO        Or    ondansetron (ZOFRAN) injection 4 mg  4 mg Intravenous Q6H PRN Sobia Card DO        oxytocin (PITOCIN) 30 units in 0.9% sodium chloride 500 mL (premix)  125 mL/hr Intravenous Continuous PRN Sobia Card DO        [START ON 6/23/2024] prenatal vitamin tablet 1 tablet  1 tablet Oral Daily Sobia Card DO        sodium chloride 0.9 % flush 1-10 mL  1-10 mL Intravenous PRN Sobia Card DO        witch hazel-glycerin (TUCKS) pad   Topical PRN Sobia Card DO   Given at 06/22/24 0500     Orders (last 24 hrs)        Start     Ordered    06/23/24 0900  docusate sodium (COLACE) capsule 100 mg  2 Times Daily         06/22/24 0453    06/23/24 0900  prenatal vitamin tablet 1 tablet  Daily         06/22/24 0453    06/23/24 0800  Sitz Bath  3 Times Daily        Comments: PRN    06/22/24 0453     06/23/24 0600  CBC & Differential  Timed        Comments: Postpartum Day 1      06/22/24 0453    06/23/24 0000  bisacodyl (DULCOLAX) suppository 10 mg  Daily PRN         06/22/24 0453    06/22/24 0900  prenatal vitamin tablet 1 tablet  Daily,   Status:  Discontinued         06/21/24 1849    06/22/24 0900  ibuprofen (ADVIL,MOTRIN) tablet 800 mg  3 times daily         06/22/24 0453    06/22/24 0600  ibuprofen (ADVIL,MOTRIN) tablet 800 mg  Every 8 Hours Scheduled,   Status:  Discontinued         06/22/24 0059    06/22/24 0454  Transfer Patient  Once         06/22/24 0453    06/22/24 0454  Code Status and Medical Interventions:  Continuous         06/22/24 0453    06/22/24 0454  Vital Signs Per Hospital Policy  Per Hospital Policy         06/22/24 0453    06/22/24 0454  Notify Physician  Until Discontinued         06/22/24 0453    06/22/24 0454  Up Ad Rosanna  Until Discontinued         06/22/24 0453    06/22/24 0454  Diet: Regular/House; Fluid Consistency: Thin (IDDSI 0)  Diet Effective Now         06/22/24 0453    06/22/24 0454  Fundal and Lochia Check  Per Hospital Policy        Comments: Q 15 min x 4, Q 30 min x 2, then Q Shift    06/22/24 0453    06/22/24 0454  RN to Assess Rh Status & Place RhIG Evaluation Order if Indicated  Continuous         06/22/24 0453    06/22/24 0454  Bladder Assessment  Per Order Details        Comments: Postpartum 1) Upon Admission to Unit & Every 4 Hours PRN Until Voiding. 2) Out of Bed to Void in 8 Hours.    06/22/24 0453    06/22/24 0454  Straight Cath  Per Order Details        Comments: Postpartum: If Distended & Unable to Void, May Repeat Once.    06/22/24 0453    06/22/24 0454  Indwelling Urinary Catheter  Per Order Details        Comments: Postpartum : After Straight Cathed x2 or if Greater Than 1000mL Residual, Insert Indwelling Urinary Catheter Until Further MD Order.    06/22/24 0453    06/22/24 0454  Breast pump to bed  Once         06/22/24 0453    06/22/24 0454  If indicated --  "Please administer RH Immunoglobulin based on results of cord blood evaluation and fetal screen lab tests, pharmacy to dispense  Continuous        Comments: See process instructions for reference range details.    06/22/24 0453    06/22/24 0453  sodium chloride 0.9 % flush 1-10 mL  As Needed         06/22/24 0453    06/22/24 0453  oxytocin (PITOCIN) 30 units in 0.9% sodium chloride 500 mL (premix)  Continuous PRN         06/22/24 0453    06/22/24 0453  acetaminophen (TYLENOL) tablet 650 mg  Every 6 Hours PRN         06/22/24 0453    06/22/24 0453  HYDROcodone-acetaminophen (NORCO) 5-325 MG per tablet 1 tablet  Every 4 Hours PRN        Placed in \"Or\" Linked Group    06/22/24 0453    06/22/24 0453  HYDROcodone-acetaminophen (NORCO)  MG per tablet 1 tablet  Every 4 Hours PRN        Placed in \"Or\" Linked Group    06/22/24 0453    06/22/24 0453  carboprost (HEMABATE) injection 250 mcg  Every 30 Minutes PRN         06/22/24 0453    06/22/24 0453  miSOPROStol (CYTOTEC) tablet 600 mcg  Once As Needed         06/22/24 0453    06/22/24 0453  methylergonovine (METHERGINE) injection 200 mcg  Once As Needed         06/22/24 0453    06/22/24 0453  hydrOXYzine (ATARAX) tablet 50 mg  Nightly PRN         06/22/24 0453    06/22/24 0453  magnesium hydroxide (MILK OF MAGNESIA) suspension 10 mL  Daily PRN         06/22/24 0453    06/22/24 0453  lanolin topical 1 Application  Every 1 Hour PRN         06/22/24 0453    06/22/24 0453  benzocaine-menthol (DERMOPLAST) 20-0.5 % topical spray  As Needed         06/22/24 0453    06/22/24 0453  witch hazel-glycerin (TUCKS) pad  As Needed         06/22/24 0453    06/22/24 0453  Hydrocortisone Ace-Pramoxine 1-1 % rectal cream 1 Application  As Needed         06/22/24 0453    06/22/24 0453  Hydrocortisone (Perianal) (ANUSOL-HC) 2.5 % rectal cream 1 Application  As Needed         06/22/24 0453    06/22/24 0453  benzocaine (AMERICAINE) 20 % rectal ointment 1 Application  As Needed         " "06/22/24 0453    06/22/24 0453  ondansetron ODT (ZOFRAN-ODT) disintegrating tablet 4 mg  Every 6 Hours PRN        Placed in \"Or\" Linked Group    06/22/24 0453    06/22/24 0453  ondansetron (ZOFRAN) injection 4 mg  Every 6 Hours PRN        Placed in \"Or\" Linked Group    06/22/24 0453    06/22/24 0100  Notify Provider (Specified)  Until Discontinued         06/22/24 0059 06/22/24 0100  Vital Signs Per Hospital Policy  Per Hospital Policy         06/22/24 0059    06/22/24 0100  Up as Tolerated  Until Discontinued         06/22/24 0059 06/22/24 0100  Diet: Regular/House; Fluid Consistency: Thin (IDDSI 0)  Diet Effective Now,   Status:  Canceled         06/22/24 0059 06/22/24 0100  Nurse May Remove Epidural Catheter After Delivery  Continuous         06/22/24 0059    06/22/24 0100  Transfer to Postpartum When Criteria Met  Continuous         06/22/24 0059    06/22/24 0059  Fundal & Lochia Check  Every Shift       06/22/24 0059    06/22/24 0059  acetaminophen (TYLENOL) tablet 650 mg  Every 4 Hours PRN,   Status:  Discontinued         06/22/24 0059 06/22/24 0059  HYDROcodone-acetaminophen (NORCO) 5-325 MG per tablet 1 tablet  Every 4 Hours PRN,   Status:  Discontinued         06/22/24 0059    06/22/24 0052  VTE Prophylaxis Not Indicated: Low Risk; No Risk Factors (0)  Once         06/22/24 0051 06/21/24 2330  oxytocin (PITOCIN) 30 units in 0.9% sodium chloride 500 mL (premix)  Continuous        Placed in \"Followed by\" Linked Group    06/21/24 2226 06/21/24 2227  Astroglide gel 1 Application  Once As Needed,   Status:  Discontinued         06/21/24 2227 06/21/24 2226  oxytocin (PITOCIN) 30 units in 0.9% sodium chloride 500 mL (premix)  Once        Placed in \"Followed by\" Linked Group    06/21/24 2226 06/21/24 2226  methylergonovine (METHERGINE) injection 200 mcg  Once As Needed         06/21/24 2226 06/21/24 2226  carboprost (HEMABATE) injection 250 mcg  As Needed,   Status:  Discontinued         " 06/21/24 2226 06/21/24 2226  miSOPROStol (CYTOTEC) tablet 600 mcg  As Needed,   Status:  Discontinued         06/21/24 2226 06/21/24 2215  famotidine (PEPCID) injection 20 mg  Every 12 Hours Scheduled,   Status:  Discontinued         06/21/24 2127 06/21/24 2130  diphenhydrAMINE (BENADRYL) injection 25 mg  Once As Needed         06/21/24 2131 06/21/24 2129  famotidine (PEPCID) injection 20 mg  Once As Needed,   Status:  Discontinued         06/21/24 2131 06/21/24 2000  pantoprazole (PROTONIX) EC tablet 40 mg  Daily,   Status:  Discontinued         06/21/24 1849 06/21/24 1848  cyclobenzaprine (FLEXERIL) tablet 10 mg  Daily PRN,   Status:  Discontinued         06/21/24 1849 06/21/24 1830  oxytocin (PITOCIN) 30 units in 0.9% sodium chloride 500 mL (premix)  Titrated,   Status:  Discontinued         06/21/24 1744 06/21/24 1815  lactated ringers bolus 1,000 mL  Once         06/21/24 1725    06/21/24 1815  fentaNYL 2mcg/mL and ropivacaine 0.2% in NS epidural 100mL  Continuous,   Status:  Discontinued         06/21/24 1725 06/21/24 1815  fentaNYL citrate (PF) (SUBLIMAZE) injection 100 mcg  Once         06/21/24 1725    06/21/24 1726  Vital Signs Per Anesthesia Guidelines  Per Order Details,   Status:  Canceled        Comments: Every 2 Minutes x5, Every 5 Minutes x4, Then If Stable Every 15 Minutes    06/21/24 1725    06/21/24 1726  Start IV (16 or 18 Gauge)  Once,   Status:  Canceled         06/21/24 1725    06/21/24 1726  Fetal Heart Rate Monitor  Once,   Status:  Canceled         06/21/24 1725    06/21/24 1726  Nurse or Anesthesiologist to Remain With Patient for 15 Minutes Following Dosing  Continuous,   Status:  Canceled         06/21/24 1725    06/21/24 1726  Facilitate Maternal Position on Side & Maintain Uterine Displacement  Continuous,   Status:  Canceled         06/21/24 1725    06/21/24 1726  Consult Anesthesia Prior to Changing Epidural Infusion / Rate  Continuous,   Status:  Canceled          06/21/24 1725    06/21/24 1726  Notify Provider  Until Discontinued,   Status:  Canceled         06/21/24 1725    06/21/24 1725  ePHEDrine Sulfate (Pressors) 5 MG/ML injection 10 mg  Every 10 Minutes PRN,   Status:  Discontinued         06/21/24 1725    06/21/24 1721  Fentanyl, Urine - Urine, Clean Catch  Once         06/21/24 1720    06/21/24 1714  ABO RH Specimen Verification  STAT         06/21/24 1713    06/21/24 1645  lactated ringers infusion  Continuous,   Status:  Discontinued         06/21/24 1559    06/21/24 1645  mineral oil  Once,   Status:  Discontinued         06/21/24 1559    06/21/24 1600  Vital Signs q 4 while awake  Every 4 Hours,   Status:  Canceled      Comments: While the patient is awake.    06/21/24 1559    06/21/24 1559  Code Status and Medical Interventions:  Continuous,   Status:  Canceled         06/21/24 1559    06/21/24 1559  Obtain Informed Consent  Once,   Status:  Canceled         06/21/24 1559    06/21/24 1559  VTE Prophylaxis Not Indicated: Low Risk; No Risk Factors (0)  Once         06/21/24 1559    06/21/24 1559  Vital Signs Per Hospital Policy  Per Hospital Policy,   Status:  Canceled         06/21/24 1559    06/21/24 1559  Confirm Presence of Amniotic Fluid if Patient Presents With SROM  Once,   Status:  Canceled         06/21/24 1559    06/21/24 1559  Mini-Prep Prior to Delivery  Once,   Status:  Canceled         06/21/24 1559    06/21/24 1559  Continuous Fetal Monitoring With NST on Admission and Prior to Initiation of Oxytocin.  Per Order Details,   Status:  Canceled        Comments: Continuous Fetal Monitoring With NST on Admission & Prior to Initiation of Oxytocin.    06/21/24 1559    06/21/24 1559  External Uterine Contraction Monitoring  Per Hospital Policy,   Status:  Canceled         06/21/24 1559    06/21/24 1559  Notify Provider (Specified)  Until Discontinued,   Status:  Canceled         06/21/24 1559    06/21/24 1559  Notify Provider of Tachysystole (Per  "Hospital Algorithm)  Until Discontinued,   Status:  Canceled         06/21/24 1559    06/21/24 1559  Notify Provider if Membranes Ruptured, Bleeding Greater Than 1 Pad Per Hour, Fetal Heart Tone Abnormality or Severe Pain  Until Discontinued,   Status:  Canceled         06/21/24 1559    06/21/24 1559  May Ambulate if Membranes Intact or Head Engaged With Ruptured BOW or Normal Tracing for 20 Minutes  Until Discontinued,   Status:  Canceled         06/21/24 1559    06/21/24 1559  Assess Need for Indwelling Urinary Catheter - Follow Removal Protocol  Continuous,   Status:  Canceled        Comments: Indwelling Urinary Catheter Removal Criteria  Discontinue Indwelling Urinary Catheter Unless One of the Following is Present:  Urinary Retention or Obstruction  Chronic Urinary Catheter Use  End of Life  Critical Illness with Strict I/O   Tract or Abdominal Surgery  Stage 3/4 Sacral / Perineal Wound  Required Activity Restriction: Trauma  Required Activity Restriction: Spine Surgery  If Patient is Being Followed by Urology Contact Them PRIOR to Removal  Do Not Remove Indwelling Urinary Catheter Order is Present with a CLINICAL REASON to Maintain the Catheter. Provider is Required to Include a Clinical Reason to Maintain a Urinary Catheter    Patient Admitted With Indwelling Urinary Catheter (Not Placed at [facility])  Assess for Continued Need & Document Medical Necessity  If Infection is Suspected, Contact the Provider       Placed in \"And\" Linked Group    06/21/24 1559 06/21/24 1559  Urinary Catheter Care  Every Shift,   Status:  Canceled      Placed in \"And\" Linked Group    06/21/24 1559 06/21/24 1559  NPO Diet NPO Type: Ice Chips  Diet Effective Now,   Status:  Canceled         06/21/24 1559 06/21/24 1559  T Pallidum Antibody w/ reflex RPR (Syphilis)  STAT         06/21/24 1559    06/21/24 1559  CBC & Differential  STAT         06/21/24 1559 06/21/24 1559  Urine Drug Screen - Urine, Clean Catch  STAT " "        06/21/24 1559    06/21/24 1559  Type & Screen  STAT         06/21/24 1559    06/21/24 1559  Insert Peripheral IV  Once,   Status:  Canceled         06/21/24 1559    06/21/24 1559  Saline Lock & Maintain IV Access  Continuous,   Status:  Canceled         06/21/24 1559    06/21/24 1559  CBC Auto Differential  PROCEDURE ONCE         06/21/24 1559    06/21/24 1558  Position Change - For Intra-Uterine Resusitation for Hypertonus, HyperStimulation or Non-Reassuring Fetal Status  As Needed,   Status:  Canceled       06/21/24 1559    06/21/24 1558  Insert Indwelling Urinary Catheter  As Needed,   Status:  Canceled      Comments: After epidural PRN.  Perform Nasal Decolonization all patients with pandya cath   Placed in \"And\" Linked Group    06/21/24 1559    06/21/24 1558  sodium chloride 0.9 % flush 10 mL  As Needed,   Status:  Discontinued         06/21/24 1559    06/21/24 1558  sodium chloride 0.9 % infusion 40 mL  As Needed,   Status:  Discontinued         06/21/24 1559    06/21/24 1558  lactated ringers bolus 1,000 mL  Once As Needed         06/21/24 1559    06/21/24 1558  acetaminophen (TYLENOL) tablet 650 mg  Every 4 Hours PRN,   Status:  Discontinued         06/21/24 1559    06/21/24 1558  butorphanol (STADOL) injection 1 mg  Every 2 Hours PRN,   Status:  Discontinued         06/21/24 1559    06/21/24 1558  butorphanol (STADOL) injection 2 mg  Every 3 Hours PRN,   Status:  Discontinued         06/21/24 1559    06/21/24 1558  ondansetron ODT (ZOFRAN-ODT) disintegrating tablet 4 mg  Every 6 Hours PRN,   Status:  Discontinued        Placed in \"Or\" Linked Group    06/21/24 1559    06/21/24 1558  ondansetron (ZOFRAN) injection 4 mg  Every 6 Hours PRN,   Status:  Discontinued        Placed in \"Or\" Linked Group    06/21/24 1559    06/21/24 1558  terbutaline (BRETHINE) injection 0.2 mg  As Needed,   Status:  Discontinued         06/21/24 1559    06/21/24 1558  sodium chloride (NS) irrigation solution 1,000 mL  Once As " Needed,   Status:  Discontinued         06/21/24 1559    06/21/24 1558  Admit To Obstetrics Inpatient  Once         06/21/24 1559    06/21/24 1513  Outpatient In A Bed  Once         06/21/24 1513    06/21/24 1513  Rapid Assay For ROM - Amniotic Fluid, Amniotic Sac  Once,   Status:  Canceled         06/21/24 1513    06/21/24 1513  Fetal Nonstress Test  Once        Comments: No chief complaint on file.      06/21/24 1513    06/21/24 0000  Group B Streptococcus Culture - Swab, Vaginal/Rectum        Comments: This is an external result entered through the Results Console.      06/21/24 1553    06/21/24 0000  Antibody Screen        Comments: This is an external result entered through the Results Console.      06/21/24 1553    06/21/24 0000  ABO / Rh        Comments: This is an external result entered through the Results Console.      06/21/24 1553    06/21/24 0000  Hepatitis B Surface Antigen        Comments: This is an external result entered through the Results Console.      06/21/24 1553    06/21/24 0000  RPR        Comments: This is an external result entered through the Results Console.      06/21/24 1553    06/21/24 0000  Rubella Antibody, IgG        Comments: This is an external result entered through the Results Console.      06/21/24 1553    06/21/24 0000  HIV-1 Antibody, EIA        Comments: This is an external result entered through the Results Console.      06/21/24 1553    06/21/24 0000  Group B Streptococcus Culture - Swab, Vaginal/Rectum        Comments: This is an external result entered through the Results Console.      06/21/24 2059    Unscheduled  Fundal & Lochia Check  As Needed      Comments: Every 15 Minutes x4, Then Every 30 Minutes x2, Then Every Shift    06/22/24 0059    Unscheduled  Apply Ice to Perineum  As Needed      Comments: For 20 min q 2 hrs    06/22/24 0453    Unscheduled  Kpad  As Needed      Comments: For pain    06/22/24 0453    Unscheduled  Warm compress  As Needed       06/22/24  0453    Unscheduled  Apply ace wrap, tight bra, or binder  As Needed       24    Unscheduled  Apply ice packs  As Needed       243    --  cyclobenzaprine (FLEXERIL) 10 MG tablet  Daily PRN         24    --  Prenatal-FeFum-FA-DHA w/o A (Prenatal + DHA) 27-1 & 250 MG therapy  Daily         24                     Operative/Procedure Notes (last 24 hours)        Sobia Card DO at 24 0048          Vaginal Delivery Procedure Note    Sirisha Bowie  Gestational Age-38.2 weeks        OBGYN: Sobia Card DO      Pre-op Diagnosis:   Pt 39 y/o  @ 38.2 weeks in active labor with SROM      Anesthesia: Epidural        Detailed Description of Procedure     The patient was prepped and draped in normal sterile fashion. The head was delivered without difficulty. There was a nuchal cord x 1. Anterior and posterior shoulders delivered without any problems. The rest of the infant was delivered in controlled fashion.The infant was bulb suctioned at delivery. The placenta delivered intact. The patient tolerated the procedure well and went to the recovery room in stable condition.        Time of delivery: 32  Maternal Blood Type: B Positive  Fetal Gender: Male  Nuchal Cord: x 1  Tears: 2nd deg midline    Blood Cord: Yes  Estimated Blood Loss: 200cc  Placenta: Spontaneous, Delivered Intact   APGARS:            Disposition: Transfer to Women's Health Floor  Condition: Stable    Sobia Card DO     Date: 2024  Time: 00:48 EDT      Electronically signed by Sobia Card DO at 24 0051       Physician Progress Notes (last 24 hours)  Notes from 24 0610 through 24   No notes of this type exist for this encounter.       Consult Notes (last 24 hours)  Notes from 24 through 24   No notes of this type exist for this encounter.

## 2024-06-22 NOTE — PLAN OF CARE
Problem: Adult Inpatient Plan of Care  Goal: Plan of Care Review  Outcome: Ongoing, Progressing  Goal: Patient-Specific Goal (Individualized)  Outcome: Ongoing, Progressing  Goal: Absence of Hospital-Acquired Illness or Injury  Outcome: Ongoing, Progressing  Goal: Optimal Comfort and Wellbeing  Outcome: Ongoing, Progressing  Goal: Readiness for Transition of Care  Outcome: Ongoing, Progressing     Problem: Bleeding (Labor)  Goal: Hemostasis  Outcome: Ongoing, Progressing     Problem: Change in Fetal Wellbeing (Labor)  Goal: Stable Fetal Wellbeing  Outcome: Ongoing, Progressing     Problem: Delayed Labor Progression (Labor)  Goal: Effective Progression to Delivery  Outcome: Ongoing, Progressing     Problem: Infection (Labor)  Goal: Absence of Infection Signs and Symptoms  Outcome: Ongoing, Progressing     Problem: Labor Pain (Labor)  Goal: Acceptable Pain Control  Outcome: Ongoing, Progressing     Problem: Uterine Tachysystole (Labor)  Goal: Normal Uterine Contraction Pattern  Outcome: Ongoing, Progressing     Problem: Fall Injury Risk  Goal: Absence of Fall and Fall-Related Injury  Outcome: Ongoing, Progressing     Problem: Adjustment to Role Transition (Postpartum Vaginal Delivery)  Goal: Successful Maternal Role Transition  Outcome: Ongoing, Progressing     Problem: Bleeding (Postpartum Vaginal Delivery)  Goal: Hemostasis  Outcome: Ongoing, Progressing     Problem: Infection (Postpartum Vaginal Delivery)  Goal: Absence of Infection Signs/Symptoms  Outcome: Ongoing, Progressing     Problem: Pain (Postpartum Vaginal Delivery)  Goal: Acceptable Pain Control  Outcome: Ongoing, Progressing     Problem: Urinary Retention (Postpartum Vaginal Delivery)  Goal: Effective Urinary Elimination  Outcome: Ongoing, Progressing     Problem:  Fall Injury Risk  Goal: Absence of Fall, Infant Drop and Related Injury  Outcome: Ongoing, Progressing     Problem: Pain Acute  Goal: Acceptable Pain Control and Functional  Ability  Outcome: Ongoing, Progressing   Goal Outcome Evaluation:

## 2024-06-22 NOTE — PLAN OF CARE
Problem: Adult Inpatient Plan of Care  Goal: Plan of Care Review  Outcome: Ongoing, Progressing  Flowsheets (Taken 6/22/2024 9097)  Progress: improving  Plan of Care Reviewed With: patient  Outcome Evaluation: Vital signs stable. Fundus is midline and firm without massage. Light lochia with no clots. Voids without difficulty. Tolerating regular diet. Ambulating independently.   Goal Outcome Evaluation:  Plan of Care Reviewed With: patient

## 2024-06-22 NOTE — PLAN OF CARE
Goal Outcome Evaluation:      Vital signs stable. Fundus midline and firm without massage. Light lochia with no clots noted. Tolerating regular diet. Voids without difficulty. Ambulating independently.

## 2024-06-22 NOTE — L&D DELIVERY NOTE
Vaginal Delivery Procedure Note    Sirisha Bowie  Gestational Age-38.2 weeks        OBGYN: Sobia Card DO      Pre-op Diagnosis:   Pt 37 y/o  @ 38.2 weeks in active labor with SROM      Anesthesia: Epidural        Detailed Description of Procedure     The patient was prepped and draped in normal sterile fashion. The head was delivered without difficulty. There was a nuchal cord x 1. Anterior and posterior shoulders delivered without any problems. The rest of the infant was delivered in controlled fashion.The infant was bulb suctioned at delivery. The placenta delivered intact. The patient tolerated the procedure well and went to the recovery room in stable condition.        Time of delivery: 32  Maternal Blood Type: B Positive  Fetal Gender: Male  Nuchal Cord: x 1  Tears: 2nd deg midline    Blood Cord: Yes  Estimated Blood Loss: 200cc  Placenta: Spontaneous, Delivered Intact   APGARS:            Disposition: Transfer to Women's Health Floor  Condition: Stable    Sobia Card DO     Date: 2024  Time: 00:48 EDT

## 2024-06-23 LAB
BASOPHILS # BLD AUTO: 0.03 10*3/MM3 (ref 0–0.2)
BASOPHILS NFR BLD AUTO: 0.3 % (ref 0–1.5)
DEPRECATED RDW RBC AUTO: 42.3 FL (ref 37–54)
EOSINOPHIL # BLD AUTO: 0.16 10*3/MM3 (ref 0–0.4)
EOSINOPHIL NFR BLD AUTO: 1.4 % (ref 0.3–6.2)
ERYTHROCYTE [DISTWIDTH] IN BLOOD BY AUTOMATED COUNT: 12.8 % (ref 12.3–15.4)
HCT VFR BLD AUTO: 32.5 % (ref 34–46.6)
HGB BLD-MCNC: 10.3 G/DL (ref 12–15.9)
IMM GRANULOCYTES # BLD AUTO: 0.11 10*3/MM3 (ref 0–0.05)
IMM GRANULOCYTES NFR BLD AUTO: 1 % (ref 0–0.5)
LYMPHOCYTES # BLD AUTO: 2.39 10*3/MM3 (ref 0.7–3.1)
LYMPHOCYTES NFR BLD AUTO: 21.3 % (ref 19.6–45.3)
MCH RBC QN AUTO: 28.5 PG (ref 26.6–33)
MCHC RBC AUTO-ENTMCNC: 31.7 G/DL (ref 31.5–35.7)
MCV RBC AUTO: 90 FL (ref 79–97)
MONOCYTES # BLD AUTO: 0.68 10*3/MM3 (ref 0.1–0.9)
MONOCYTES NFR BLD AUTO: 6 % (ref 5–12)
NEUTROPHILS NFR BLD AUTO: 7.87 10*3/MM3 (ref 1.7–7)
NEUTROPHILS NFR BLD AUTO: 70 % (ref 42.7–76)
NRBC BLD AUTO-RTO: 0 /100 WBC (ref 0–0.2)
PLATELET # BLD AUTO: 216 10*3/MM3 (ref 140–450)
PMV BLD AUTO: 10.4 FL (ref 6–12)
RBC # BLD AUTO: 3.61 10*6/MM3 (ref 3.77–5.28)
WBC NRBC COR # BLD AUTO: 11.24 10*3/MM3 (ref 3.4–10.8)

## 2024-06-23 PROCEDURE — 85025 COMPLETE CBC W/AUTO DIFF WBC: CPT | Performed by: OBSTETRICS & GYNECOLOGY

## 2024-06-23 RX ADMIN — Medication: at 06:20

## 2024-06-23 RX ADMIN — WITCH HAZEL: 500 SOLUTION RECTAL; TOPICAL at 15:31

## 2024-06-23 RX ADMIN — HYDROCORTISONE 2.5% 1 APPLICATION: 25 CREAM TOPICAL at 06:20

## 2024-06-23 RX ADMIN — DOCUSATE SODIUM 100 MG: 100 CAPSULE, LIQUID FILLED ORAL at 21:00

## 2024-06-23 RX ADMIN — DOCUSATE SODIUM 100 MG: 100 CAPSULE, LIQUID FILLED ORAL at 08:00

## 2024-06-23 RX ADMIN — WITCH HAZEL: 500 SOLUTION RECTAL; TOPICAL at 06:20

## 2024-06-23 RX ADMIN — PRENATAL VIT W/ FE FUMARATE-FA TAB 27-0.8 MG 1 TABLET: 27-0.8 TAB at 08:00

## 2024-06-23 RX ADMIN — HYDROCORTISONE 2.5% 1 APPLICATION: 25 CREAM TOPICAL at 15:31

## 2024-06-23 RX ADMIN — IBUPROFEN 800 MG: 800 TABLET, FILM COATED ORAL at 14:20

## 2024-06-23 RX ADMIN — BENZOCAINE 1 APPLICATION: 5.6 OINTMENT TOPICAL at 06:20

## 2024-06-23 RX ADMIN — IBUPROFEN 800 MG: 800 TABLET, FILM COATED ORAL at 08:00

## 2024-06-23 RX ADMIN — SIMETHICONE 80 MG: 80 TABLET, CHEWABLE ORAL at 03:22

## 2024-06-23 RX ADMIN — IBUPROFEN 800 MG: 800 TABLET, FILM COATED ORAL at 21:00

## 2024-06-23 RX ADMIN — Medication: at 15:31

## 2024-06-23 RX ADMIN — BENZOCAINE 1 APPLICATION: 5.6 OINTMENT TOPICAL at 15:31

## 2024-06-23 NOTE — PLAN OF CARE
Goal Outcome Evaluation:  Plan of Care Reviewed With: patient        Progress: improving  Outcome Evaluation: Patient ambulating in room. Fundus firm and at midline. Bleeding WNL. Pt voiding spontaneously without difficulty. Pain managed with Ibuprofen. Pt c/o dizziness/lightheadedness when standing; encouraged slow changes in position and PO fluid intake.

## 2024-06-23 NOTE — PLAN OF CARE
Goal Outcome Evaluation:  Plan of Care Reviewed With: patient        Progress: improving  Outcome Evaluation: Vital signs stable. Pain controlled with scheduled medications only during this shift. Fundus is midline and firm. Light lochia with no clots. Voids without difficulty. Tolerating regular diet. Ambulating frequently in room.

## 2024-06-23 NOTE — PROGRESS NOTES
" [facility]  Vaginal Delivery Progress Note    Subjective   Subjective  Postpartum Day 1: Vaginal Delivery    The patient feels well.  Her pain is well controlled with nonsteroidal anti-inflammatory drugs.   She is ambulating well.  Patient describes her bleeding as moderate lochia.    Breastfeeding: declines.    Objective     Objective:  Vital signs (most recent): Blood pressure 103/57, pulse 73, temperature 97.8 °F (36.6 °C), temperature source Oral, resp. rate 16, height 163.8 cm (64.5\"), weight 90.7 kg (200 lb), SpO2 97%, currently breastfeeding.     Vital Signs Range for the last 24 hours  Temperature: Temp:  [97.8 °F (36.6 °C)-97.9 °F (36.6 °C)] 97.8 °F (36.6 °C)   Temp Source: Temp src: Oral   BP: BP: ()/(57-64) 103/57   Pulse: Heart Rate:  [65-73] 73   Respirations: Resp:  [16-17] 16   Weight:       Admit Height:  Height: 163.8 cm (64.5\")    Physical Exam:  General:  no acute distresss.  Abdomen: Fundus: appropriate, firm, non tender  Extremities: normal, atraumatic, no cyanosis, and trace edema.     [unfilled]       Lab Results   Component Value Date    ABO B 06/21/2024    RH Positive 06/21/2024        Lab Results   Component Value Date    HGB 10.8 (L) 06/22/2024    HCT 32.9 (L) 06/22/2024         Assessment & Plan   Principal Problem:    Amniotic fluid leaking  Active Problems:    Normal labor      Sirisha Bowie is Day 1  post-partum  Vaginal, Spontaneous   .      Plan:  Continue current care.      Sobia Card, DO  6/23/2024  05:56 EDT    "

## 2024-06-24 VITALS
WEIGHT: 200 LBS | BODY MASS INDEX: 33.32 KG/M2 | TEMPERATURE: 98.7 F | RESPIRATION RATE: 16 BRPM | OXYGEN SATURATION: 100 % | DIASTOLIC BLOOD PRESSURE: 67 MMHG | SYSTOLIC BLOOD PRESSURE: 117 MMHG | HEIGHT: 65 IN | HEART RATE: 69 BPM

## 2024-06-24 RX ORDER — PSEUDOEPHEDRINE HCL 30 MG
100 TABLET ORAL 2 TIMES DAILY
Qty: 60 CAPSULE | Refills: 1 | Status: SHIPPED | OUTPATIENT
Start: 2024-06-24

## 2024-06-24 RX ORDER — IBUPROFEN 600 MG/1
600 TABLET ORAL EVERY 6 HOURS PRN
Qty: 40 TABLET | Refills: 1 | Status: SHIPPED | OUTPATIENT
Start: 2024-06-24

## 2024-06-24 RX ADMIN — DOCUSATE SODIUM 100 MG: 100 CAPSULE, LIQUID FILLED ORAL at 08:05

## 2024-06-24 RX ADMIN — PRENATAL VIT W/ FE FUMARATE-FA TAB 27-0.8 MG 1 TABLET: 27-0.8 TAB at 08:05

## 2024-06-24 RX ADMIN — IBUPROFEN 800 MG: 800 TABLET, FILM COATED ORAL at 08:04

## 2024-06-24 NOTE — DISCHARGE INSTR - APPOINTMENTS
You have a follow-up appointment at [facility] on Monday, July 15 at 8:15 am with PETR Roque.

## 2024-06-24 NOTE — PAYOR COMM NOTE
"PATIENT DISCHARGED TO HOME ON 6/24/24    REFER TO AUTH # [id number]     Sirisha Carias (38 y.o. Female)       Date of Birth   1985    Social Security Number       Address   [address] [place], [place] [address]    Home Phone   [phone]    MRN   [id number]       Protestant   Buddhist    Marital Status                               Admission Date   6/21/24    Admission Type   Elective    Admitting Provider   Sobia Card DO    Attending Provider       Department, Room/Bed   [facility], W238/1       Discharge Date   6/24/2024    Discharge Disposition   Home or Self Care    Discharge Destination                                 Attending Provider: (none)   Allergies: No Known Allergies    Isolation: None   Infection: None   Code Status: CPR    Ht: 163.8 cm (64.5\")   Wt: 90.7 kg (200 lb)    Admission Cmt: None   Principal Problem: Amniotic fluid leaking [O42.90]                   Active Insurance as of 6/21/2024       Primary Coverage       Payor Plan Insurance Group Employer/Plan Group    [employer] [place] [employer] [place]ICA[place]        Payor Plan Address Payor Plan Phone Number Payor Plan Fax Number Effective Dates    [address] [address] [phone]  12/5/2017 - None Entered    [facility] [address]         Subscriber Name Subscriber Birth Date Member ID       SIRISHA CARIAS 1985 [id number]                     Emergency Contacts        (Rel.) Home Phone Work Phone Mobile Phone    CINTHIA CARIAS (Spouse) -- -- [phone]                 Discharge Summary        Josselin Alston APRN at 06/24/24 0912          [facility]  Delivery Discharge Summary    Primary OB Clinician:     EDC: Estimated Date of Delivery: 7/4/24    Gestational Age:38w2d    Antepartum complications: none    Date of Delivery: 6/22/2024   Time of Delivery: 12:32 AM     Delivered By:  Sobia Card     Delivery Type: Vaginal, Spontaneous      Baby:  male  " "  Apgar:  7  @ 1 minute /   Apgar:  9  @ 5 minutes   Weight:  3630 g (8 lb)     Anesthesia: Epidural      Intrapartum complications: None    Rh Immune globulin given: not applicable    Subjective   Subjective  Postpartum Day 2: Vaginal Delivery    The patient feels well.  Her pain is well controlled with nonsteroidal anti-inflammatory drugs.   She is ambulating well.  Patient describes her bleeding as thin lochia.    Breastfeeding: without difficulty.    Objective     Objective:  Vital signs (most recent): Blood pressure 117/67, pulse 69, temperature 98.7 °F (37.1 °C), temperature source Oral, resp. rate 16, height 163.8 cm (64.5\"), weight 90.7 kg (200 lb), SpO2 100%, currently breastfeeding.     Vital Signs Range for the last 24 hours  Temperature: Temp:  [98.5 °F (36.9 °C)-98.7 °F (37.1 °C)] 98.7 °F (37.1 °C)   Temp Source: Temp src: Oral   BP: BP: ()/(49-67) 117/67   Pulse: Heart Rate:  [65-75] 69   Respirations: Resp:  [16-18] 16   Weight:       Admit Height:  Height: 163.8 cm (64.5\")    Physical Exam:  General:  no acute distress.  Abdomen: Fundus: appropriate, firm, non tender  Extremities: normal, atraumatic, no cyanosis, and trace edema.     [unfilled]       Lab Results   Component Value Date    ABO B 2024    RH Positive 2024        Lab Results   Component Value Date    HGB 10.3 (L) 2024    HCT 32.5 (L) 2024         Assesment and Plan:      Amniotic fluid leaking    Normal labor    Postpartum care following vaginal delivery    Assessment & Plan    Plan:      Follow-up appointment with [facility] in 3 weeks.    Discharge Date: 2024; Discharge Time: 09:12 EDT        PETR Sanchez  2024  09:12 EDT      Electronically signed by Josselin Alston APRN at 24 0912       "

## 2024-06-24 NOTE — PLAN OF CARE
Goal Outcome Evaluation:  Plan of Care Reviewed With: patient        Progress: improving  Outcome Evaluation: Patient ambulating in room. Fundus firm and at midline. Bleeding WNL. Pt voiding spontaneously. Pain managed with Ibuprofen. Pt denies headache, vision changes and dizziness. VSS. Pt to be discharged today per APRN.

## 2024-06-24 NOTE — PLAN OF CARE
Goal Outcome Evaluation:  Plan of Care Reviewed With: patient        Progress: improving  Outcome Evaluation: Ambulating independently in room. Fundus firm and midline, light lochia. Voids without difficulty. Pt denies dizziness this shift.